# Patient Record
Sex: FEMALE | Race: BLACK OR AFRICAN AMERICAN | Employment: FULL TIME | URBAN - METROPOLITAN AREA
[De-identification: names, ages, dates, MRNs, and addresses within clinical notes are randomized per-mention and may not be internally consistent; named-entity substitution may affect disease eponyms.]

---

## 2018-01-30 ENCOUNTER — HOSPITAL ENCOUNTER (EMERGENCY)
Age: 28
Discharge: HOME | End: 2018-01-30
Attending: OBSTETRICS & GYNECOLOGY | Admitting: OBSTETRICS & GYNECOLOGY

## 2018-01-30 ENCOUNTER — APPOINTMENT (OUTPATIENT)
Dept: ULTRASOUND IMAGING | Age: 28
End: 2018-01-30
Attending: OBSTETRICS & GYNECOLOGY
Payer: MEDICAID

## 2018-01-30 ENCOUNTER — HOSPITAL ENCOUNTER (OUTPATIENT)
Age: 28
Setting detail: OBSERVATION
Discharge: HOME | End: 2018-01-30
Attending: OBSTETRICS & GYNECOLOGY | Admitting: OBSTETRICS & GYNECOLOGY
Payer: MEDICAID

## 2018-01-30 VITALS
TEMPERATURE: 98.2 F | DIASTOLIC BLOOD PRESSURE: 57 MMHG | RESPIRATION RATE: 17 BRPM | HEART RATE: 68 BPM | OXYGEN SATURATION: 98 % | SYSTOLIC BLOOD PRESSURE: 116 MMHG

## 2018-01-30 PROBLEM — R10.9 ABDOMINAL PAIN IN PREGNANCY, THIRD TRIMESTER: Status: ACTIVE | Noted: 2018-01-30

## 2018-01-30 PROBLEM — O26.893 ABDOMINAL PAIN IN PREGNANCY, THIRD TRIMESTER: Status: ACTIVE | Noted: 2018-01-30

## 2018-01-30 LAB
ALBUMIN SERPL-MCNC: 2.6 G/DL (ref 3.5–5)
ALBUMIN/GLOB SERPL: 0.6 {RATIO} (ref 1.2–3.5)
ALP SERPL-CCNC: 102 U/L (ref 50–136)
ALT SERPL-CCNC: 24 U/L (ref 12–65)
AMPHET UR QL SCN: NEGATIVE
AMYLASE SERPL-CCNC: 33 U/L (ref 15–115)
ANION GAP SERPL CALC-SCNC: 14 MMOL/L (ref 7–16)
AST SERPL-CCNC: 43 U/L (ref 15–37)
BARBITURATES UR QL SCN: NEGATIVE
BASOPHILS # BLD: 0 K/UL (ref 0–0.2)
BASOPHILS NFR BLD: 0 % (ref 0–2)
BENZODIAZ UR QL: NEGATIVE
BILIRUB SERPL-MCNC: 0.6 MG/DL (ref 0.2–1.1)
BUN SERPL-MCNC: 5 MG/DL (ref 6–23)
CALCIUM SERPL-MCNC: 8.7 MG/DL (ref 8.3–10.4)
CANNABINOIDS UR QL SCN: NEGATIVE
CHLORIDE SERPL-SCNC: 104 MMOL/L (ref 98–107)
CO2 SERPL-SCNC: 22 MMOL/L (ref 21–32)
COCAINE UR QL SCN: NEGATIVE
CREAT SERPL-MCNC: 0.69 MG/DL (ref 0.6–1)
DIFFERENTIAL METHOD BLD: ABNORMAL
EOSINOPHIL # BLD: 0.1 K/UL (ref 0–0.8)
EOSINOPHIL NFR BLD: 1 % (ref 0.5–7.8)
ERYTHROCYTE [DISTWIDTH] IN BLOOD BY AUTOMATED COUNT: 14.2 % (ref 11.9–14.6)
GLOBULIN SER CALC-MCNC: 4.2 G/DL (ref 2.3–3.5)
GLUCOSE SERPL-MCNC: 102 MG/DL (ref 65–100)
GLUCOSE, GLUUPC: NEGATIVE
HCT VFR BLD AUTO: 34.7 % (ref 35.8–46.3)
HGB BLD-MCNC: 11.6 G/DL (ref 11.7–15.4)
IMM GRANULOCYTES # BLD: 0 K/UL (ref 0–0.5)
IMM GRANULOCYTES NFR BLD AUTO: 0 % (ref 0–5)
KETONES UR-MCNC: NORMAL MG/DL
LIPASE SERPL-CCNC: 122 U/L (ref 73–393)
LYMPHOCYTES # BLD: 1.9 K/UL (ref 0.5–4.6)
LYMPHOCYTES NFR BLD: 19 % (ref 13–44)
MCH RBC QN AUTO: 28 PG (ref 26.1–32.9)
MCHC RBC AUTO-ENTMCNC: 33.4 G/DL (ref 31.4–35)
MCV RBC AUTO: 83.6 FL (ref 79.6–97.8)
METHADONE UR QL: NEGATIVE
MONOCYTES # BLD: 0.8 K/UL (ref 0.1–1.3)
MONOCYTES NFR BLD: 8 % (ref 4–12)
NEUTS SEG # BLD: 7.3 K/UL (ref 1.7–8.2)
NEUTS SEG NFR BLD: 72 % (ref 43–78)
OPIATES UR QL: NEGATIVE
PCP UR QL: NEGATIVE
PLATELET # BLD AUTO: 299 K/UL (ref 150–450)
PMV BLD AUTO: 10 FL (ref 10.8–14.1)
POTASSIUM SERPL-SCNC: 4.2 MMOL/L (ref 3.5–5.1)
PROT SERPL-MCNC: 6.8 G/DL (ref 6.3–8.2)
PROT UR QL: NORMAL
RBC # BLD AUTO: 4.15 M/UL (ref 4.05–5.25)
SODIUM SERPL-SCNC: 140 MMOL/L (ref 136–145)
WBC # BLD AUTO: 10 K/UL (ref 4.3–11.1)

## 2018-01-30 PROCEDURE — 82150 ASSAY OF AMYLASE: CPT | Performed by: OBSTETRICS & GYNECOLOGY

## 2018-01-30 PROCEDURE — 80307 DRUG TEST PRSMV CHEM ANLYZR: CPT | Performed by: OBSTETRICS & GYNECOLOGY

## 2018-01-30 PROCEDURE — 74011250636 HC RX REV CODE- 250/636: Performed by: OBSTETRICS & GYNECOLOGY

## 2018-01-30 PROCEDURE — 99282 EMERGENCY DEPT VISIT SF MDM: CPT

## 2018-01-30 PROCEDURE — 59025 FETAL NON-STRESS TEST: CPT

## 2018-01-30 PROCEDURE — 96375 TX/PRO/DX INJ NEW DRUG ADDON: CPT

## 2018-01-30 PROCEDURE — 74011258636 HC RX REV CODE- 258/636: Performed by: OBSTETRICS & GYNECOLOGY

## 2018-01-30 PROCEDURE — 83690 ASSAY OF LIPASE: CPT | Performed by: OBSTETRICS & GYNECOLOGY

## 2018-01-30 PROCEDURE — 96374 THER/PROPH/DIAG INJ IV PUSH: CPT

## 2018-01-30 PROCEDURE — 80053 COMPREHEN METABOLIC PANEL: CPT | Performed by: OBSTETRICS & GYNECOLOGY

## 2018-01-30 PROCEDURE — 85025 COMPLETE CBC W/AUTO DIFF WBC: CPT | Performed by: OBSTETRICS & GYNECOLOGY

## 2018-01-30 PROCEDURE — 76705 ECHO EXAM OF ABDOMEN: CPT

## 2018-01-30 PROCEDURE — 99218 HC RM OBSERVATION: CPT

## 2018-01-30 PROCEDURE — 96361 HYDRATE IV INFUSION ADD-ON: CPT

## 2018-01-30 PROCEDURE — 81002 URINALYSIS NONAUTO W/O SCOPE: CPT | Performed by: OBSTETRICS & GYNECOLOGY

## 2018-01-30 PROCEDURE — 96365 THER/PROPH/DIAG IV INF INIT: CPT

## 2018-01-30 PROCEDURE — 74011000250 HC RX REV CODE- 250: Performed by: OBSTETRICS & GYNECOLOGY

## 2018-01-30 RX ORDER — HYDROMORPHONE HYDROCHLORIDE 2 MG/ML
1 INJECTION, SOLUTION INTRAMUSCULAR; INTRAVENOUS; SUBCUTANEOUS ONCE
Status: COMPLETED | OUTPATIENT
Start: 2018-01-30 | End: 2018-01-30

## 2018-01-30 RX ORDER — SODIUM CHLORIDE 9 MG/ML
150 INJECTION, SOLUTION INTRAVENOUS CONTINUOUS
Status: DISCONTINUED | OUTPATIENT
Start: 2018-01-30 | End: 2018-01-30 | Stop reason: HOSPADM

## 2018-01-30 RX ORDER — SODIUM CHLORIDE 0.9 % (FLUSH) 0.9 %
5-10 SYRINGE (ML) INJECTION AS NEEDED
Status: DISCONTINUED | OUTPATIENT
Start: 2018-01-30 | End: 2018-01-30 | Stop reason: HOSPADM

## 2018-01-30 RX ORDER — SODIUM CHLORIDE 0.9 % (FLUSH) 0.9 %
5-10 SYRINGE (ML) INJECTION EVERY 8 HOURS
Status: DISCONTINUED | OUTPATIENT
Start: 2018-01-30 | End: 2018-01-30 | Stop reason: HOSPADM

## 2018-01-30 RX ORDER — DEXTROSE, SODIUM CHLORIDE, SODIUM LACTATE, POTASSIUM CHLORIDE, AND CALCIUM CHLORIDE 5; .6; .31; .03; .02 G/100ML; G/100ML; G/100ML; G/100ML; G/100ML
125 INJECTION, SOLUTION INTRAVENOUS CONTINUOUS
Status: DISCONTINUED | OUTPATIENT
Start: 2018-01-30 | End: 2018-01-30 | Stop reason: HOSPADM

## 2018-01-30 RX ADMIN — SODIUM CHLORIDE 150 ML/HR: 900 INJECTION, SOLUTION INTRAVENOUS at 02:24

## 2018-01-30 RX ADMIN — PROMETHAZINE HYDROCHLORIDE 12.5 MG: 25 INJECTION INTRAMUSCULAR; INTRAVENOUS at 02:27

## 2018-01-30 RX ADMIN — SODIUM CHLORIDE, SODIUM LACTATE, POTASSIUM CHLORIDE, CALCIUM CHLORIDE, AND DEXTROSE MONOHYDRATE 125 ML/HR: 600; 310; 30; 20; 5 INJECTION, SOLUTION INTRAVENOUS at 03:05

## 2018-01-30 RX ADMIN — HYDROMORPHONE HYDROCHLORIDE 1 MG: 2 INJECTION, SOLUTION INTRAMUSCULAR; INTRAVENOUS; SUBCUTANEOUS at 03:00

## 2018-01-30 NOTE — PROGRESS NOTES
SBAR report from Nellie Sullivan RN, care assumed. Patient being admitted to room 439 for 23 hour observations per Dr. Bonnie Rehman r/t abdominal pain.

## 2018-01-30 NOTE — PROGRESS NOTES
Patient taken off monitor taken to room 439 via stretcher spouse remains at bedside.   SBAR report to Lilli Franco RN

## 2018-01-30 NOTE — PROGRESS NOTES
01/30/18 0255   Maternal Vital Signs   Temp 98.2 °F (36.8 °C)   Temp Source Oral   Pulse (Heart Rate) 69   Resp Rate 16   O2 Sat (%) 97 %   Level of Consciousness Alert   BP 95/46   MAP (Calculated) (!) 62   BP 1 Method Automatic   BP 1 Location Right arm   BP Patient Position Hip tilt, left     Dr. Olinda Davison at bedside. Patient very drowsy but arousable with verbal commands. Will continue EFM monitoring and Oxygen Saturation. Labs received and reported to Dr. Olinda Davison, no new orders. SCDs applied, IV infusing (see MAR), Assessment completed (fall and mo scale assessed)- see flow sheet. Family remains at bedside, will continue to monitor. Instructed to call as needed.

## 2018-01-30 NOTE — PROGRESS NOTES
Prenatal records received from [address] [place] [address] (with Authorization for Release of Protected Health information documentation signed by patient). Reviewed at this time and placed in chart accordingly.

## 2018-01-30 NOTE — PROGRESS NOTES
Patient presents to triage in wheelchair accompained by spouse, crying and moaning loudly in visible pain. Taking to Triage room 1 toco and cardio applied, patient continues to cry out on pain. Spouse states that they were seen at [facility] earlier around 1900 that she was there for about an hour they gave her something for indigestion. She states that this seemed to help they went and at around 2330 she woke up with extreme upper quadrant pain that was constant. Patient states that she has thrown up earlier today at work that she works at a  and that they sent 2 children home today sick.

## 2018-01-30 NOTE — H&P
History & Physical    Name: Jeronimo Hua MRN: [id number]  SSN: [id number]    YOB: 1990  Age: 34 y.o. Sex: female      Subjective:     Reason for Admission:  Pregnancy and right upper quadrant abdominal pain    History of Present Illness: Ms. Beverli Runner is a 34 y.o. G1  female with an estimated gestational age of 29 weeks with EDC of 3/19/2018. Patient complains of severe abdominal pain for 1 days. Pregnancy has been complicated by no complications. Pt was well until around noon today when she began having ruq abd pain. She was seen at [facility] and while there pain improved and she was given po pepcid . She then ate chick filet and pain returned- more severe with associated vomiting. No diarrhea. No fever. 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 OB History   No data available     PMHx: none  PSHX: none  Family HX: grandmother with breast cancer and diabetes  Soc Hx: former smoker  Works in day care  Fob involved     Review of Systems:  A comprehensive review of systems was negative except for that written in the History of Present Illness. Objective:     Vitals: There were no vitals filed for this visit. Temp (24hrs), Av °F (-17.8 °C), Min:, Max:    No Data Recorded     Physical Exam:  Patient with distress- appears in significant pain on arrival. Pain improving while in triage.   Heart: Regular rate and rhythm  Lung: clear to auscultation throughout lung fields, no wheezes, no rales, no rhonchi and normal respiratory effort  Back: costovertebral angle tenderness absent  Abdomen: soft, nontender  Fundus: soft and non tender  Perineum: blood absent, amniotic fluid absent  Cervical Exam: Closed/Thick/High  Lower Extremities:  - Edema No   - Patellar Reflexes: 2+ bilaterally     Membranes:  Intact  Uterine Activity:  None  Fetal Heart Rate:  Reactive       Lab/Data Review:  Recent Results (from the past 12 hour(s))   CBC WITH AUTOMATED DIFF    Collection Time: 01/30/18  2:11 AM   Result Value Ref Range    WBC 10.0 4.3 - 11.1 K/uL    RBC 4.15 4.05 - 5.25 M/uL    HGB 11.6 (L) 11.7 - 15.4 g/dL    HCT 34.7 (L) 35.8 - 46.3 %    MCV 83.6 79.6 - 97.8 FL    MCH 28.0 26.1 - 32.9 PG    MCHC 33.4 31.4 - 35.0 g/dL    RDW 14.2 11.9 - 14.6 %    PLATELET 662 949 - 109 K/uL    MPV 10.0 (L) 10.8 - 14.1 FL    DF AUTOMATED      NEUTROPHILS 72 43 - 78 %    LYMPHOCYTES 19 13 - 44 %    MONOCYTES 8 4.0 - 12.0 %    EOSINOPHILS 1 0.5 - 7.8 %    BASOPHILS 0 0.0 - 2.0 %    IMMATURE GRANULOCYTES 0 0.0 - 5.0 %    ABS. NEUTROPHILS 7.3 1.7 - 8.2 K/UL    ABS. LYMPHOCYTES 1.9 0.5 - 4.6 K/UL    ABS. MONOCYTES 0.8 0.1 - 1.3 K/UL    ABS. EOSINOPHILS 0.1 0.0 - 0.8 K/UL    ABS. BASOPHILS 0.0 0.0 - 0.2 K/UL    ABS. IMM. GRANS. 0.0 0.0 - 0.5 K/UL   METABOLIC PANEL, COMPREHENSIVE    Collection Time: 01/30/18  2:11 AM   Result Value Ref Range    Sodium 140 136 - 145 mmol/L    Potassium 4.2 3.5 - 5.1 mmol/L    Chloride 104 98 - 107 mmol/L    CO2 22 21 - 32 mmol/L    Anion gap 14 7 - 16 mmol/L    Glucose 102 (H) 65 - 100 mg/dL    BUN 5 (L) 6 - 23 MG/DL    Creatinine 0.69 0.6 - 1.0 MG/DL    GFR est AA >60 >60 ml/min/1.73m2    GFR est non-AA >60 >60 ml/min/1.73m2    Calcium 8.7 8.3 - 10.4 MG/DL    Bilirubin, total 0.6 0.2 - 1.1 MG/DL    ALT (SGPT) 24 12 - 65 U/L    AST (SGOT) 43 (H) 15 - 37 U/L    Alk.  phosphatase 102 50 - 136 U/L    Protein, total 6.8 6.3 - 8.2 g/dL    Albumin 2.6 (L) 3.5 - 5.0 g/dL    Globulin 4.2 (H) 2.3 - 3.5 g/dL    A-G Ratio 0.6 (L) 1.2 - 3.5     LIPASE    Collection Time: 01/30/18  2:11 AM   Result Value Ref Range    Lipase 122 73 - 393 U/L   AMYLASE    Collection Time: 01/30/18  2:11 AM   Result Value Ref Range    Amylase 33 15 - 115 U/L   DRUG SCREEN, URINE    Collection Time: 01/30/18  2:20 AM   Result Value Ref Range    PCP(PHENCYCLIDINE) NEGATIVE       BENZODIAZEPINES NEGATIVE       COCAINE NEGATIVE       AMPHETAMINES NEGATIVE       METHADONE NEGATIVE       THC (TH-CANNABINOL) NEGATIVE       OPIATES NEGATIVE       BARBITURATES NEGATIVE          Assessment and Plan: Active Problems:    Abdominal pain in pregnancy, third trimester (1/30/2018)       Acute onset severe abdominal pain- already improved. Labs wnl.   Suspect cholelithiasis vs acute gastroenteritis with vomiting  Will monitor overnight with ultrasound of gallbladder in am  Reassuring fetal heart rate tracing with no evidence of labor    Signed By:  Alaina Davidson MD     January 30, 2018

## 2018-01-30 NOTE — PROGRESS NOTES
Slept well with no further pain or vomiting throughout night.   Ultrasound of gall bladder this am  Anticipate d/c home after ultrasound

## 2018-01-30 NOTE — PROGRESS NOTES
Given discharge instructions with verbalized understanding. Discharged home in good condition ambulatory.

## 2018-01-30 NOTE — IP AVS SNAPSHOT
[address] [place]  
[phone] Patient: Thirza Sicard MRN: [id number] UGI:[id number] About your hospitalization You were admitted on:  January 30, 2018 You last received care in the:  [facility] 4 ANTEPARTUM You were discharged on:  January 30, 2018 Why you were hospitalized Your primary diagnosis was:  Not on File Your diagnoses also included:  Abdominal Pain In Pregnancy, Third Trimester Follow-up Information None Discharge Orders None A check dilcia indicates which time of day the medication should be taken. My Medications ASK your doctor about these medications Instructions Each Dose to Equal  
 Morning Noon Evening Bedtime PRENATAL DHA+COMPLETE PRENATAL -300 mg-mcg-mg Cmpk Generic drug:  [id number] candice-folic-dha Your last dose was: Your next dose is: Take  by mouth. Discharge Instructions Learning About Acute Cholecystitis What is cholecystitis? Cholecystitis (say \"koh-lih-sis-TY-tus\") is inflammation of the gallbladder. The gallbladder stores bile. Bile helps the body digest food. Normally, the bile flows from the gallbladder to the small intestine. A gallstone stuck in the cystic duct is most often the cause of sudden (acute) cholecystitis. The cystic duct is the tube that carries the bile out of the gallbladder. The gallstone blocks the bile from leaving the gallbladder. This results in an irritated and swollen gallbladder. The disease can also be caused by infection or trauma, such as an injury from a car accident. Cholecystitis has to be treated right away. You will probably have to go to the hospital. Surgery is the usual treatment. What are the symptoms? Symptoms include: · Steady and severe pain in the upper right part of belly.  This is the most common symptom. The pain can sometimes move to your back or right shoulder blade. It may last for more than 6 hours. · Nausea or vomiting. · A fever. How is it treated? The main way to treat this disease is surgery to remove the gallbladder. This surgery can often be done through small cuts (incisions) in the belly. This is called a laparoscopic cholecystectomy. In some cases, you may need a more extensive surgery. You may need surgery as soon as possible. The doctor may try to reduce swelling and irritation in the gallbladder before removing it. You may be given fluids and antibiotics through an IV. You may also be given pain medicine. Follow-up care is a key part of your treatment and safety. Be sure to make and go to all appointments, and call your doctor if you are having problems. It's also a good idea to know your test results and keep a list of the medicines you take. Where can you learn more? Go to http://cate-asa.info/. Enter T940 in the search box to learn more about \"Learning About Acute Cholecystitis. \" Current as of: May 12, 2017 Content Version: 11.4 © 5371-2539 [employer]. Care instructions adapted under license by [employer] (which disclaims liability or warranty for this information). If you have questions about a medical condition or this instruction, always ask your healthcare professional. [place] [address] any warranty or liability for your use of this information. Pregnancy Precautions: Care Instructions Your Care Instructions There is no sure way to prevent labor before your due date ( labor) or to prevent most other pregnancy problems. But there are things you can do to increase your chances of a healthy pregnancy. Go to your appointments, follow your doctor's advice, and take good care of yourself. Eat well, and exercise (if your doctor agrees). And make sure to drink plenty of water. Follow-up care is a key part of your treatment and safety. Be sure to make and go to all appointments, and call your doctor if you are having problems. It's also a good idea to know your test results and keep a list of the medicines you take. How can you care for yourself at home? · Make sure you go to your prenatal appointments. At each visit, your doctor will check your blood pressure. Your doctor will also check to see if you have protein in your urine. High blood pressure and protein in urine are signs of preeclampsia. This condition can be dangerous for you and your baby. · Drink plenty of fluids, enough so that your urine is light yellow or clear like water. Dehydration can cause contractions. If you have kidney, heart, or liver disease and have to limit fluids, talk with your doctor before you increase the amount of fluids you drink. · Tell your doctor right away if you notice any symptoms of an infection, such as: ¨ Burning when you urinate. ¨ A foul-smelling discharge from your vagina. ¨ Vaginal itching. ¨ Unexplained fever. ¨ Unusual pain or soreness in your uterus or lower belly. · Eat a balanced diet. Include plenty of foods that are high in calcium and iron. ¨ Foods high in calcium include milk, cheese, yogurt, almonds, and broccoli. ¨ Foods high in iron include red meat, shellfish, poultry, eggs, beans, raisins, whole-grain bread, and leafy green vegetables. · Do not smoke. If you need help quitting, talk to your doctor about stop-smoking programs and medicines. These can increase your chances of quitting for good. · Do not drink alcohol or use illegal drugs. · Follow your doctor's directions about activity. Your doctor will let you know how much, if any, exercise you can do. · Ask your doctor if you can have sex. If you are at risk for early labor, your doctor may ask you to not have sex. · Take care to prevent falls.  During pregnancy, your joints are loose, and your balance is off. Sports such as bicycling, skiing, or in-line skating can increase your risk of falling. And don't ride horses or motorcycles, dive, water ski, scuba dive, or parachute jump while you are pregnant. · Avoid getting very hot. Do not use saunas or hot tubs. Avoid staying out in the sun in hot weather for long periods. Take acetaminophen (Tylenol) to lower a high fever. · Do not take any over-the-counter or herbal medicines or supplements without talking to your doctor or pharmacist first. 
When should you call for help? Call 911 anytime you think you may need emergency care. For example, call if: 
? · You passed out (lost consciousness). ? · You have severe vaginal bleeding. ? · You have severe pain in your belly or pelvis. ? · You have had fluid gushing or leaking from your vagina and you know or think the umbilical cord is bulging into your vagina. If this happens, immediately get down on your knees so your rear end (buttocks) is higher than your head. This will decrease the pressure on the cord until help arrives. ?Call your doctor now or seek immediate medical care if: 
? · You have signs of preeclampsia, such as: 
¨ Sudden swelling of your face, hands, or feet. ¨ New vision problems (such as dimness or blurring). ¨ A severe headache. ? · You have any vaginal bleeding. ? · You have belly pain or cramping. ? · You have a fever. ? · You have had regular contractions (with or without pain) for an hour. This means that you have 8 or more within 1 hour or 4 or more in 20 minutes after you change your position and drink fluids. ? · You have a sudden release of fluid from your vagina. ? · You have low back pain or pelvic pressure that does not go away. ? · You notice that your baby has stopped moving or is moving much less than normal. ? Watch closely for changes in your health, and be sure to contact your doctor if you have any problems. Where can you learn more? Go to http://cate-asa.info/. Enter [id number] in the search box to learn more about \"Pregnancy Precautions: Care Instructions. \" Current as of: March 16, 2017 Content Version: 11.4 © 7084-9662 [employer]. Care instructions adapted under license by [employer] (which disclaims liability or warranty for this information). If you have questions about a medical condition or this instruction, always ask your healthcare professional. [address] any warranty or liability for your use of this information. Introducing \[facility]\"" & HEALTH SERVICES! [employer] introduces Zhihu patient portal. Now you can access parts of your medical record, email your doctor's office, and request medication refills online. 1. In your internet browser, go to [email or web address] 2. Click on the First Time User? Click Here link in the Sign In box. You will see the New Member Sign Up page. 3. Enter your Zhihu Access Code exactly as it appears below. You will not need to use this code after youve completed the sign-up process. If you do not sign up before the expiration date, you must request a new code. · Zhihu Access Code: [email or web address] Expires: 4/30/2018 11:14 AM 
 
4. Enter the last four digits of your Social Security Number (xxxx) and Date of Birth (mm/dd/yyyy) as indicated and click Submit. You will be taken to the next sign-up page. 5. Create a Zhihu ID. This will be your Zhihu login ID and cannot be changed, so think of one that is secure and easy to remember. 6. Create a Zhihu password. You can change your password at any time. 7. Enter your Password Reset Question and Answer. This can be used at a later time if you forget your password. 8. Enter your e-mail address. You will receive e-mail notification when new information is available in [address] 9. Click Sign Up. You can now view and download portions of your medical record. 10. Click the Download Summary menu link to download a portable copy of your medical information. If you have questions, please visit the Frequently Asked Questions section of the INFOGRAPHIQS website. Remember, INFOGRAPHIQS is NOT to be used for urgent needs. For medical emergencies, dial 911. Now available from your iPhone and Android! Providers Seen During Your Hospitalization Provider Specialty Primary office phone Gloria Navarro MD Obstetrics & Gynecology [phone] Your Primary Care Physician (PCP) Primary Care Physician Office Phone Office Fax UNKNOWN, PROVIDER ** None ** ** None ** You are allergic to the following No active allergies Recent Documentation Breastfeeding? OB Status Smoking Status No Pregnant Former Smoker Patient Belongings The following personal items are in your possession at time of discharge: 
  Dental Appliances: None         Home Medications: None   Jewelry: None  Clothing: Footwear, Shirt, Undergarments, With patient, Pants       Personal Items Sent to Safe: NONE Please provide this summary of care documentation to your next provider. Signatures-by signing, you are acknowledging that this After Visit Summary has been reviewed with you and you have received a copy. Patient Signature:  ____________________________________________________________ Date:  ____________________________________________________________  
  
Casey Christina Provider Signature:  ____________________________________________________________ Date:  ____________________________________________________________

## 2018-01-30 NOTE — IP AVS SNAPSHOT
Summary of Care Report The Summary of Care report has been created to help improve care coordination. Users with access to [employer] or [address] (Web-based application) may access additional patient information including the Discharge Summary. If you are not currently a [address] user and need more information, please call the number listed below in the [place] [address] section and ask to be connected with Medical Records. Facility Information Name Address Phone [address] [place] [address] [address] [place] [phone] Patient Information Patient Name Sex MELISSA Fuentes ([id number]) Female 1990 Discharge Information Admitting Provider Service Area Unit Rere Oliver MD / [id number] [facility] 4 Antepartum / [phone] Discharge Provider Discharge Date/Time Discharge Disposition Destination (none) (none) (none) (none) Hospital Problems as of 2018  Never Reviewed Class Noted - Resolved Last Modified POA Active Problems Abdominal pain in pregnancy, third trimester  2018 - Present 2018 by Rere Oliver MD Unknown Entered by Rere Oliver MD  
  
You are allergic to the following No active allergies Current Discharge Medication List  
  
ASK your doctor about these medications Dose & Instructions Dispensing Information Comments PRENATAL DHA+COMPLETE PRENATAL 300 mg-mcg-mg Cmpk Generic drug:  [id number] candice-folic-dha Take  by mouth. Refills:  0 Follow-up Information None Discharge Instructions Learning About Acute Cholecystitis What is cholecystitis?  
 
Cholecystitis (say \"koh-lih-sis-TY-tus\") is inflammation of the gallbladder. The gallbladder stores bile. Bile helps the body digest food. Normally, the bile flows from the gallbladder to the small intestine. A gallstone stuck in the cystic duct is most often the cause of sudden (acute) cholecystitis. The cystic duct is the tube that carries the bile out of the gallbladder. The gallstone blocks the bile from leaving the gallbladder. This results in an irritated and swollen gallbladder. The disease can also be caused by infection or trauma, such as an injury from a car accident. Cholecystitis has to be treated right away. You will probably have to go to the hospital. Surgery is the usual treatment. What are the symptoms? Symptoms include: · Steady and severe pain in the upper right part of belly. This is the most common symptom. The pain can sometimes move to your back or right shoulder blade. It may last for more than 6 hours. · Nausea or vomiting. · A fever. How is it treated? The main way to treat this disease is surgery to remove the gallbladder. This surgery can often be done through small cuts (incisions) in the belly. This is called a laparoscopic cholecystectomy. In some cases, you may need a more extensive surgery. You may need surgery as soon as possible. The doctor may try to reduce swelling and irritation in the gallbladder before removing it. You may be given fluids and antibiotics through an IV. You may also be given pain medicine. Follow-up care is a key part of your treatment and safety. Be sure to make and go to all appointments, and call your doctor if you are having problems. It's also a good idea to know your test results and keep a list of the medicines you take. Where can you learn more? Go to http://cate-asa.info/. Enter T940 in the search box to learn more about \"Learning About Acute Cholecystitis. \" Current as of: May 12, 2017 Content Version: 11.4 © 2938-0173 [employer]. Care instructions adapted under license by [employer] (which disclaims liability or warranty for this information). If you have questions about a medical condition or this instruction, always ask your healthcare professional. [address] any warranty or liability for your use of this information. Pregnancy Precautions: Care Instructions Your Care Instructions There is no sure way to prevent labor before your due date ( labor) or to prevent most other pregnancy problems. But there are things you can do to increase your chances of a healthy pregnancy. Go to your appointments, follow your doctor's advice, and take good care of yourself. Eat well, and exercise (if your doctor agrees). And make sure to drink plenty of water. Follow-up care is a key part of your treatment and safety. Be sure to make and go to all appointments, and call your doctor if you are having problems. It's also a good idea to know your test results and keep a list of the medicines you take. How can you care for yourself at home? · Make sure you go to your prenatal appointments. At each visit, your doctor will check your blood pressure. Your doctor will also check to see if you have protein in your urine. High blood pressure and protein in urine are signs of preeclampsia. This condition can be dangerous for you and your baby. · Drink plenty of fluids, enough so that your urine is light yellow or clear like water. Dehydration can cause contractions. If you have kidney, heart, or liver disease and have to limit fluids, talk with your doctor before you increase the amount of fluids you drink. · Tell your doctor right away if you notice any symptoms of an infection, such as: ¨ Burning when you urinate. ¨ A foul-smelling discharge from your vagina. ¨ Vaginal itching. ¨ Unexplained fever. ¨ Unusual pain or soreness in your uterus or lower belly. · Eat a balanced diet. Include plenty of foods that are high in calcium and iron. ¨ Foods high in calcium include milk, cheese, yogurt, almonds, and broccoli. ¨ Foods high in iron include red meat, shellfish, poultry, eggs, beans, raisins, whole-grain bread, and leafy green vegetables. · Do not smoke. If you need help quitting, talk to your doctor about stop-smoking programs and medicines. These can increase your chances of quitting for good. · Do not drink alcohol or use illegal drugs. · Follow your doctor's directions about activity. Your doctor will let you know how much, if any, exercise you can do. · Ask your doctor if you can have sex. If you are at risk for early labor, your doctor may ask you to not have sex. · Take care to prevent falls. During pregnancy, your joints are loose, and your balance is off. Sports such as bicycling, skiing, or in-line skating can increase your risk of falling. And don't ride horses or motorcycles, dive, water ski, scuba dive, or parachute jump while you are pregnant. · Avoid getting very hot. Do not use saunas or hot tubs. Avoid staying out in the sun in hot weather for long periods. Take acetaminophen (Tylenol) to lower a high fever. · Do not take any over-the-counter or herbal medicines or supplements without talking to your doctor or pharmacist first. 
When should you call for help? Call 911 anytime you think you may need emergency care. For example, call if: 
? · You passed out (lost consciousness). ? · You have severe vaginal bleeding. ? · You have severe pain in your belly or pelvis. ? · You have had fluid gushing or leaking from your vagina and you know or think the umbilical cord is bulging into your vagina. If this happens, immediately get down on your knees so your rear end (buttocks) is higher than your head. This will decrease the pressure on the cord until help arrives. ?Call your doctor now or seek immediate medical care if: ? · You have signs of preeclampsia, such as: 
¨ Sudden swelling of your face, hands, or feet. ¨ New vision problems (such as dimness or blurring). ¨ A severe headache. ? · You have any vaginal bleeding. ? · You have belly pain or cramping. ? · You have a fever. ? · You have had regular contractions (with or without pain) for an hour. This means that you have 8 or more within 1 hour or 4 or more in 20 minutes after you change your position and drink fluids. ? · You have a sudden release of fluid from your vagina. ? · You have low back pain or pelvic pressure that does not go away. ? · You notice that your baby has stopped moving or is moving much less than normal. ? Watch closely for changes in your health, and be sure to contact your doctor if you have any problems. Where can you learn more? Go to http://cate-asa.info/. Enter [id number] in the search box to learn more about \"Pregnancy Precautions: Care Instructions. \" Current as of: March 16, 2017 Content Version: 11.4 © 7377-9921 [employer]. Care instructions adapted under license by [employer] (which disclaims liability or warranty for this information). If you have questions about a medical condition or this instruction, always ask your healthcare professional. [place] [address] any warranty or liability for your use of this information. Chart Review Routing History No Routing History on File

## 2018-01-30 NOTE — DISCHARGE INSTRUCTIONS
Learning About Acute Cholecystitis  What is cholecystitis? Cholecystitis (say \"koh-lih-sis-TY-tus\") is inflammation of the gallbladder. The gallbladder stores bile. Bile helps the body digest food. Normally, the bile flows from the gallbladder to the small intestine. A gallstone stuck in the cystic duct is most often the cause of sudden (acute) cholecystitis. The cystic duct is the tube that carries the bile out of the gallbladder. The gallstone blocks the bile from leaving the gallbladder. This results in an irritated and swollen gallbladder. The disease can also be caused by infection or trauma, such as an injury from a car accident. Cholecystitis has to be treated right away. You will probably have to go to the hospital. Surgery is the usual treatment. What are the symptoms? Symptoms include:  · Steady and severe pain in the upper right part of belly. This is the most common symptom. The pain can sometimes move to your back or right shoulder blade. It may last for more than 6 hours. · Nausea or vomiting. · A fever. How is it treated? The main way to treat this disease is surgery to remove the gallbladder. This surgery can often be done through small cuts (incisions) in the belly. This is called a laparoscopic cholecystectomy. In some cases, you may need a more extensive surgery. You may need surgery as soon as possible. The doctor may try to reduce swelling and irritation in the gallbladder before removing it. You may be given fluids and antibiotics through an IV. You may also be given pain medicine. Follow-up care is a key part of your treatment and safety. Be sure to make and go to all appointments, and call your doctor if you are having problems. It's also a good idea to know your test results and keep a list of the medicines you take. Where can you learn more? Go to http://cate-asa.info/.   Enter T940 in the search box to learn more about \"Learning About Acute Cholecystitis. \"  Current as of: May 12, 2017  Content Version: 11.4  © 2471-7602 [employer]. Care instructions adapted under license by [employer] (which disclaims liability or warranty for this information). If you have questions about a medical condition or this instruction, always ask your healthcare professional. [address] any warranty or liability for your use of this information. Pregnancy Precautions: Care Instructions  Your Care Instructions    There is no sure way to prevent labor before your due date ( labor) or to prevent most other pregnancy problems. But there are things you can do to increase your chances of a healthy pregnancy. Go to your appointments, follow your doctor's advice, and take good care of yourself. Eat well, and exercise (if your doctor agrees). And make sure to drink plenty of water. Follow-up care is a key part of your treatment and safety. Be sure to make and go to all appointments, and call your doctor if you are having problems. It's also a good idea to know your test results and keep a list of the medicines you take. How can you care for yourself at home? · Make sure you go to your prenatal appointments. At each visit, your doctor will check your blood pressure. Your doctor will also check to see if you have protein in your urine. High blood pressure and protein in urine are signs of preeclampsia. This condition can be dangerous for you and your baby. · Drink plenty of fluids, enough so that your urine is light yellow or clear like water. Dehydration can cause contractions. If you have kidney, heart, or liver disease and have to limit fluids, talk with your doctor before you increase the amount of fluids you drink. · Tell your doctor right away if you notice any symptoms of an infection, such as:  ¨ Burning when you urinate. ¨ A foul-smelling discharge from your vagina. ¨ Vaginal itching.   ¨ Unexplained fever.  ¨ Unusual pain or soreness in your uterus or lower belly. · Eat a balanced diet. Include plenty of foods that are high in calcium and iron. ¨ Foods high in calcium include milk, cheese, yogurt, almonds, and broccoli. ¨ Foods high in iron include red meat, shellfish, poultry, eggs, beans, raisins, whole-grain bread, and leafy green vegetables. · Do not smoke. If you need help quitting, talk to your doctor about stop-smoking programs and medicines. These can increase your chances of quitting for good. · Do not drink alcohol or use illegal drugs. · Follow your doctor's directions about activity. Your doctor will let you know how much, if any, exercise you can do. · Ask your doctor if you can have sex. If you are at risk for early labor, your doctor may ask you to not have sex. · Take care to prevent falls. During pregnancy, your joints are loose, and your balance is off. Sports such as bicycling, skiing, or in-line skating can increase your risk of falling. And don't ride horses or motorcycles, dive, water ski, scuba dive, or parachute jump while you are pregnant. · Avoid getting very hot. Do not use saunas or hot tubs. Avoid staying out in the sun in hot weather for long periods. Take acetaminophen (Tylenol) to lower a high fever. · Do not take any over-the-counter or herbal medicines or supplements without talking to your doctor or pharmacist first.  When should you call for help? Call 911 anytime you think you may need emergency care. For example, call if:  ? · You passed out (lost consciousness). ? · You have severe vaginal bleeding. ? · You have severe pain in your belly or pelvis. ? · You have had fluid gushing or leaking from your vagina and you know or think the umbilical cord is bulging into your vagina. If this happens, immediately get down on your knees so your rear end (buttocks) is higher than your head. This will decrease the pressure on the cord until help arrives.    ?Call your doctor now or seek immediate medical care if:  ? · You have signs of preeclampsia, such as:  ¨ Sudden swelling of your face, hands, or feet. ¨ New vision problems (such as dimness or blurring). ¨ A severe headache. ? · You have any vaginal bleeding. ? · You have belly pain or cramping. ? · You have a fever. ? · You have had regular contractions (with or without pain) for an hour. This means that you have 8 or more within 1 hour or 4 or more in 20 minutes after you change your position and drink fluids. ? · You have a sudden release of fluid from your vagina. ? · You have low back pain or pelvic pressure that does not go away. ? · You notice that your baby has stopped moving or is moving much less than normal.   ? Watch closely for changes in your health, and be sure to contact your doctor if you have any problems. Where can you learn more? Go to http://cate-asa.info/. Enter [id number] in the search box to learn more about \"Pregnancy Precautions: Care Instructions. \"  Current as of: March 16, 2017  Content Version: 11.4  © 7205-7366 [employer]. Care instructions adapted under license by [employer] (which disclaims liability or warranty for this information). If you have questions about a medical condition or this instruction, always ask your healthcare professional. [address] any warranty or liability for your use of this information.

## 2018-01-30 NOTE — PROGRESS NOTES
01/30/18 0500   Maternal Vital Signs   Pulse (Heart Rate) 65   Resp Rate 17   O2 Sat (%) 98 %   Level of Consciousness Alert   /59   MAP (Calculated) 75   BP 1 Method Automatic   BP 1 Location Right arm   BP Patient Position At rest     Patient resting in bed with family at bedside. No c/o pain or discomfort at this time. Patient remains drowsy, but more alert. Will continue to monitor.

## 2018-03-07 PROBLEM — Z34.03 PRIMIGRAVIDA, THIRD TRIMESTER: Status: ACTIVE | Noted: 2018-03-07

## 2018-03-07 PROBLEM — Z22.330 GBS CARRIER: Status: ACTIVE | Noted: 2018-03-07

## 2018-03-09 PROBLEM — O99.019 ANEMIA AFFECTING THIRD PREGNANCY: Status: ACTIVE | Noted: 2018-03-09

## 2018-03-15 PROBLEM — O26.849 FETAL SIZE INCONSISTENT WITH DATES: Status: ACTIVE | Noted: 2018-03-15

## 2018-03-21 ENCOUNTER — ANESTHESIA EVENT (OUTPATIENT)
Dept: LABOR AND DELIVERY | Age: 28
DRG: 560 | End: 2018-03-21
Payer: MEDICAID

## 2018-03-21 ENCOUNTER — HOSPITAL ENCOUNTER (INPATIENT)
Age: 28
LOS: 2 days | Discharge: HOME | DRG: 560 | End: 2018-03-23
Attending: OBSTETRICS & GYNECOLOGY | Admitting: OBSTETRICS & GYNECOLOGY
Payer: MEDICAID

## 2018-03-21 ENCOUNTER — ANESTHESIA (OUTPATIENT)
Dept: LABOR AND DELIVERY | Age: 28
DRG: 560 | End: 2018-03-21
Payer: MEDICAID

## 2018-03-21 DIAGNOSIS — Z37.9 NORMAL LABOR: Primary | ICD-10-CM

## 2018-03-21 PROBLEM — Z3A.40 40 WEEKS GESTATION OF PREGNANCY: Status: ACTIVE | Noted: 2018-03-21

## 2018-03-21 LAB
ABO + RH BLD: NORMAL
BASE DEFICIT BLDCOA-SCNC: 5.3 MMOL/L (ref 0–2)
BASE DEFICIT BLDCOV-SCNC: 5.2 MMOL/L (ref 1.9–7.7)
BDY SITE: ABNORMAL
BDY SITE: ABNORMAL
BLOOD GROUP ANTIBODIES SERPL: NORMAL
ERYTHROCYTE [DISTWIDTH] IN BLOOD BY AUTOMATED COUNT: 14.5 % (ref 11.9–14.6)
HCO3 BLDCOA-SCNC: 24 MMOL/L (ref 22–26)
HCO3 BLDV-SCNC: 22 MMOL/L
HCT VFR BLD AUTO: 33.5 % (ref 35.8–46.3)
HGB BLD-MCNC: 11.2 G/DL (ref 11.7–15.4)
MCH RBC QN AUTO: 27.2 PG (ref 26.1–32.9)
MCHC RBC AUTO-ENTMCNC: 33.4 G/DL (ref 31.4–35)
MCV RBC AUTO: 81.3 FL (ref 79.6–97.8)
PCO2 BLDCOA: 62 MMHG (ref 33–49)
PCO2 BLDCOV: 46 MMHG (ref 14.1–43.3)
PH BLDCOA: 7.2 [PH] (ref 7.21–7.31)
PH BLDCOV: 7.29 [PH] (ref 7.2–7.44)
PLATELET # BLD AUTO: 331 K/UL (ref 150–450)
PMV BLD AUTO: 9.8 FL (ref 10.8–14.1)
PO2 BLDCOA: 22 MMHG (ref 9–19)
PO2 BLDV: 28 MMHG (ref 30.4–57.2)
RBC # BLD AUTO: 4.12 M/UL (ref 4.05–5.25)
SERVICE CMNT-IMP: ABNORMAL
SERVICE CMNT-IMP: ABNORMAL
SPECIMEN EXP DATE BLD: NORMAL
WBC # BLD AUTO: 8.2 K/UL (ref 4.3–11.1)

## 2018-03-21 PROCEDURE — 77030014125 HC TY EPDRL BBMI -B: Performed by: ANESTHESIOLOGY

## 2018-03-21 PROCEDURE — 75410000003 HC RECOV DEL/VAG/CSECN EA 0.5 HR

## 2018-03-21 PROCEDURE — 74011250637 HC RX REV CODE- 250/637: Performed by: OBSTETRICS & GYNECOLOGY

## 2018-03-21 PROCEDURE — 74011250636 HC RX REV CODE- 250/636

## 2018-03-21 PROCEDURE — 75410000000 HC DELIVERY VAGINAL/SINGLE

## 2018-03-21 PROCEDURE — 76060000078 HC EPIDURAL ANESTHESIA

## 2018-03-21 PROCEDURE — 82803 BLOOD GASES ANY COMBINATION: CPT

## 2018-03-21 PROCEDURE — 77030009413 HC ELECTRD SCALP COVD -A

## 2018-03-21 PROCEDURE — 59409 OBSTETRICAL CARE: CPT | Performed by: OBSTETRICS & GYNECOLOGY

## 2018-03-21 PROCEDURE — 77010026065 HC OXYGEN MINIMUM MEDICAL AIR

## 2018-03-21 PROCEDURE — 77030003028 HC SUT VCRL J&J -A

## 2018-03-21 PROCEDURE — 77030011945 HC CATH URIN INT ST MENT -A

## 2018-03-21 PROCEDURE — 77030005518 HC CATH URETH FOL 2W BARD -B

## 2018-03-21 PROCEDURE — 74011250636 HC RX REV CODE- 250/636: Performed by: OBSTETRICS & GYNECOLOGY

## 2018-03-21 PROCEDURE — A4300 CATH IMPL VASC ACCESS PORTAL: HCPCS | Performed by: ANESTHESIOLOGY

## 2018-03-21 PROCEDURE — 74011250637 HC RX REV CODE- 250/637: Performed by: ANESTHESIOLOGY

## 2018-03-21 PROCEDURE — 75410000002 HC LABOR FEE PER 1 HR

## 2018-03-21 PROCEDURE — 77030032490 HC SLV COMPR SCD KNE COVD -B

## 2018-03-21 PROCEDURE — 0HQ9XZZ REPAIR PERINEUM SKIN, EXTERNAL APPROACH: ICD-10-PCS | Performed by: OBSTETRICS & GYNECOLOGY

## 2018-03-21 PROCEDURE — 85027 COMPLETE CBC AUTOMATED: CPT | Performed by: OBSTETRICS & GYNECOLOGY

## 2018-03-21 PROCEDURE — 86900 BLOOD TYPING SEROLOGIC ABO: CPT | Performed by: OBSTETRICS & GYNECOLOGY

## 2018-03-21 PROCEDURE — 74011000258 HC RX REV CODE- 258: Performed by: OBSTETRICS & GYNECOLOGY

## 2018-03-21 PROCEDURE — 65270000029 HC RM PRIVATE

## 2018-03-21 PROCEDURE — 4A1HXCZ MONITORING OF PRODUCTS OF CONCEPTION, CARDIAC RATE, EXTERNAL APPROACH: ICD-10-PCS | Performed by: OBSTETRICS & GYNECOLOGY

## 2018-03-21 PROCEDURE — 77030018846 HC SOL IRR STRL H20 ICUM -A

## 2018-03-21 PROCEDURE — 36415 COLL VENOUS BLD VENIPUNCTURE: CPT | Performed by: OBSTETRICS & GYNECOLOGY

## 2018-03-21 PROCEDURE — 74011258636 HC RX REV CODE- 258/636: Performed by: OBSTETRICS & GYNECOLOGY

## 2018-03-21 RX ORDER — FAMOTIDINE 20 MG/1
20 TABLET, FILM COATED ORAL 2 TIMES DAILY
Status: DISCONTINUED | OUTPATIENT
Start: 2018-03-21 | End: 2018-03-23 | Stop reason: HOSPADM

## 2018-03-21 RX ORDER — MINERAL OIL
120 OIL (ML) ORAL
Status: COMPLETED | OUTPATIENT
Start: 2018-03-21 | End: 2018-03-21

## 2018-03-21 RX ORDER — FAMOTIDINE 20 MG/1
20 TABLET, FILM COATED ORAL ONCE
Status: DISCONTINUED | OUTPATIENT
Start: 2018-03-21 | End: 2018-03-21 | Stop reason: HOSPADM

## 2018-03-21 RX ORDER — OXYTOCIN/0.9 % SODIUM CHLORIDE 15/250 ML
250 PLASTIC BAG, INJECTION (ML) INTRAVENOUS ONCE
Status: ACTIVE | OUTPATIENT
Start: 2018-03-21 | End: 2018-03-22

## 2018-03-21 RX ORDER — HYDROCODONE BITARTRATE AND ACETAMINOPHEN 5; 325 MG/1; MG/1
1 TABLET ORAL
Status: DISCONTINUED | OUTPATIENT
Start: 2018-03-21 | End: 2018-03-23 | Stop reason: HOSPADM

## 2018-03-21 RX ORDER — OXYCODONE HYDROCHLORIDE 5 MG/1
10 TABLET ORAL
Status: DISCONTINUED | OUTPATIENT
Start: 2018-03-21 | End: 2018-03-23 | Stop reason: HOSPADM

## 2018-03-21 RX ORDER — IBUPROFEN 600 MG/1
600 TABLET ORAL
Status: DISCONTINUED | OUTPATIENT
Start: 2018-03-21 | End: 2018-03-23 | Stop reason: HOSPADM

## 2018-03-21 RX ORDER — DEXTROSE, SODIUM CHLORIDE, SODIUM LACTATE, POTASSIUM CHLORIDE, AND CALCIUM CHLORIDE 5; .6; .31; .03; .02 G/100ML; G/100ML; G/100ML; G/100ML; G/100ML
125 INJECTION, SOLUTION INTRAVENOUS CONTINUOUS
Status: DISCONTINUED | OUTPATIENT
Start: 2018-03-21 | End: 2018-03-22

## 2018-03-21 RX ORDER — SODIUM CHLORIDE 0.9 % (FLUSH) 0.9 %
5-10 SYRINGE (ML) INJECTION AS NEEDED
Status: DISCONTINUED | OUTPATIENT
Start: 2018-03-21 | End: 2018-03-22

## 2018-03-21 RX ORDER — SODIUM CHLORIDE 0.9 % (FLUSH) 0.9 %
5-10 SYRINGE (ML) INJECTION AS NEEDED
Status: DISCONTINUED | OUTPATIENT
Start: 2018-03-21 | End: 2018-03-21 | Stop reason: HOSPADM

## 2018-03-21 RX ORDER — OXYTOCIN/0.9 % SODIUM CHLORIDE 15/250 ML
250 PLASTIC BAG, INJECTION (ML) INTRAVENOUS ONCE
Status: ACTIVE | OUTPATIENT
Start: 2018-03-21 | End: 2018-03-21

## 2018-03-21 RX ORDER — SODIUM CHLORIDE 0.9 % (FLUSH) 0.9 %
5-10 SYRINGE (ML) INJECTION EVERY 8 HOURS
Status: DISCONTINUED | OUTPATIENT
Start: 2018-03-21 | End: 2018-03-21 | Stop reason: HOSPADM

## 2018-03-21 RX ORDER — OXYTOCIN/0.9 % SODIUM CHLORIDE 30/500 ML
.5-2 PLASTIC BAG, INJECTION (ML) INTRAVENOUS
Status: DISCONTINUED | OUTPATIENT
Start: 2018-03-21 | End: 2018-03-22

## 2018-03-21 RX ORDER — LIDOCAINE HYDROCHLORIDE 20 MG/ML
JELLY TOPICAL
Status: DISCONTINUED | OUTPATIENT
Start: 2018-03-21 | End: 2018-03-21 | Stop reason: HOSPADM

## 2018-03-21 RX ORDER — PROMETHAZINE HYDROCHLORIDE 25 MG/1
25 TABLET ORAL
Status: DISCONTINUED | OUTPATIENT
Start: 2018-03-21 | End: 2018-03-23 | Stop reason: HOSPADM

## 2018-03-21 RX ORDER — SIMETHICONE 80 MG
80 TABLET,CHEWABLE ORAL
Status: DISCONTINUED | OUTPATIENT
Start: 2018-03-21 | End: 2018-03-23 | Stop reason: HOSPADM

## 2018-03-21 RX ORDER — DIPHENHYDRAMINE HCL 25 MG
25 CAPSULE ORAL
Status: DISCONTINUED | OUTPATIENT
Start: 2018-03-21 | End: 2018-03-23 | Stop reason: HOSPADM

## 2018-03-21 RX ORDER — ROPIVACAINE HYDROCHLORIDE 2 MG/ML
INJECTION, SOLUTION EPIDURAL; INFILTRATION; PERINEURAL
Status: DISCONTINUED | OUTPATIENT
Start: 2018-03-21 | End: 2018-03-22 | Stop reason: HOSPADM

## 2018-03-21 RX ORDER — PRENATAL VIT 96/IRON FUM/FOLIC 27MG-0.8MG
1 TABLET ORAL DAILY
Status: DISCONTINUED | OUTPATIENT
Start: 2018-03-22 | End: 2018-03-23 | Stop reason: HOSPADM

## 2018-03-21 RX ORDER — SODIUM CHLORIDE 0.9 % (FLUSH) 0.9 %
5-10 SYRINGE (ML) INJECTION EVERY 8 HOURS
Status: DISCONTINUED | OUTPATIENT
Start: 2018-03-21 | End: 2018-03-22

## 2018-03-21 RX ORDER — OXYTOCIN/0.9 % SODIUM CHLORIDE 30/500 ML
PLASTIC BAG, INJECTION (ML) INTRAVENOUS
Status: COMPLETED
Start: 2018-03-21 | End: 2018-03-21

## 2018-03-21 RX ORDER — DOCUSATE SODIUM 100 MG/1
100 CAPSULE, LIQUID FILLED ORAL
Status: DISCONTINUED | OUTPATIENT
Start: 2018-03-21 | End: 2018-03-23 | Stop reason: HOSPADM

## 2018-03-21 RX ORDER — PENICILLIN G POTASSIUM 5000000 [IU]/1
INJECTION, POWDER, FOR SOLUTION INTRAMUSCULAR; INTRAVENOUS
Status: ACTIVE
Start: 2018-03-21 | End: 2018-03-21

## 2018-03-21 RX ORDER — SODIUM CHLORIDE 900 MG/100ML
INJECTION INTRAVENOUS
Status: ACTIVE
Start: 2018-03-21 | End: 2018-03-21

## 2018-03-21 RX ORDER — ZOLPIDEM TARTRATE 5 MG/1
5 TABLET ORAL
Status: DISCONTINUED | OUTPATIENT
Start: 2018-03-21 | End: 2018-03-23 | Stop reason: HOSPADM

## 2018-03-21 RX ORDER — LIDOCAINE HYDROCHLORIDE 10 MG/ML
1 INJECTION INFILTRATION; PERINEURAL
Status: DISCONTINUED | OUTPATIENT
Start: 2018-03-21 | End: 2018-03-21 | Stop reason: HOSPADM

## 2018-03-21 RX ORDER — OXYCODONE HYDROCHLORIDE 5 MG/1
5 TABLET ORAL
Status: DISCONTINUED | OUTPATIENT
Start: 2018-03-21 | End: 2018-03-23 | Stop reason: HOSPADM

## 2018-03-21 RX ADMIN — SODIUM CHLORIDE, SODIUM LACTATE, POTASSIUM CHLORIDE, CALCIUM CHLORIDE, AND DEXTROSE MONOHYDRATE 125 ML/HR: 600; 310; 30; 20; 5 INJECTION, SOLUTION INTRAVENOUS at 06:01

## 2018-03-21 RX ADMIN — OXYTOCIN 4 MILLI-UNITS/MIN: 10 INJECTION, SOLUTION INTRAMUSCULAR; INTRAVENOUS at 07:00

## 2018-03-21 RX ADMIN — MINERAL OIL 120 ML: 471.95 OIL ORAL at 17:00

## 2018-03-21 RX ADMIN — IBUPROFEN 600 MG: 600 TABLET, FILM COATED ORAL at 19:27

## 2018-03-21 RX ADMIN — PENICILLIN G POTASSIUM 2.5 MILLION UNITS: 20000000 POWDER, FOR SOLUTION INTRAVENOUS at 15:53

## 2018-03-21 RX ADMIN — PENICILLIN G POTASSIUM 2.5 MILLION UNITS: 20000000 POWDER, FOR SOLUTION INTRAVENOUS at 11:31

## 2018-03-21 RX ADMIN — Medication 4 MILLI-UNITS/MIN: at 07:00

## 2018-03-21 RX ADMIN — SODIUM CHLORIDE, SODIUM LACTATE, POTASSIUM CHLORIDE, CALCIUM CHLORIDE, AND DEXTROSE MONOHYDRATE 125 ML/HR: 600; 310; 30; 20; 5 INJECTION, SOLUTION INTRAVENOUS at 11:30

## 2018-03-21 RX ADMIN — SODIUM CHLORIDE 5 MILLION UNITS: 900 INJECTION, SOLUTION INTRAVENOUS at 03:00

## 2018-03-21 RX ADMIN — ROPIVACAINE HYDROCHLORIDE 8 ML/HR: 2 INJECTION, SOLUTION EPIDURAL; INFILTRATION; PERINEURAL at 08:30

## 2018-03-21 RX ADMIN — FAMOTIDINE 20 MG: 20 TABLET, FILM COATED ORAL at 08:08

## 2018-03-21 RX ADMIN — PENICILLIN G POTASSIUM 2.5 MILLION UNITS: 20000000 POWDER, FOR SOLUTION INTRAVENOUS at 07:22

## 2018-03-21 NOTE — PROGRESS NOTES
56- baby girl  46- placenta  3365- first degree midline lac repaired without difficulty  1750- pericare/pad change. Back to bed now.

## 2018-03-21 NOTE — L&D DELIVERY NOTE
Present for entire delivery. Details in delivery summary. . Viable Female Infant, APGARS 8,9 .   Weight 7 lbs. , 5 oz.  mL  Pain mgmt  epidural    Placenta spont, intact, 3VC. 1st degree midline vaginal/perineal laceration - repaired 3-0 wicryl rapide    NICU present, cord gases sent. Pt and infant stable.         Tolu Escudero MD     5:50 PM    18

## 2018-03-21 NOTE — PROGRESS NOTES
o2 in place. Pt pulled up in the bed. Gomez placed to gravity without difficulty. Concentrated urine returned. Pitocin remains on 2 mu/min. Pt comfortable.

## 2018-03-21 NOTE — PROGRESS NOTES
Subjective: Pt tolerating labor well. Objective:    Vitals:    18 0935 18 0949 18 1004 18 1018   BP: 122/58 129/69 123/68 121/67   Pulse: 63 70 88 75   Temp:           FHT's:  Baseline 's, occ variable and late decels, good RTB, reactive  [place]:  Ctx q 3-5 min    SVE:  /-1    Assessement and Plan: Val Yanez 34 y.o.  at 40w2d admitted for postdates IOL    Continue pitocin augmentation.   IUPC and FSE placed without difficulty    Pain control: epidural    GBS: positive, being treated      Jose Mccall MD    11:33 AM    18

## 2018-03-21 NOTE — PROGRESS NOTES
Dr Milena Mensah updated on pt status. May have an epidural. MD rodriguez to [facility] for arom.  LR bolus started

## 2018-03-21 NOTE — PROGRESS NOTES
Dr Kenna Naik at bs. Arom. 4-5/c/0. Light meconium. lr bolusing.  Per dr Tata Da Silva - give a pepcid po

## 2018-03-21 NOTE — PROGRESS NOTES
Baby girl at 58 with neonatology and RT present for delivery for meconium. apgars 8/9. edc of 40.2 weeks  Weight of 3330g (7klbs 5oz) and length of 20.5in (52cm). Small midline 1st degree laceration repaired without difficulty. gbs positive and pt received 4 doses of abx. AGA. Assessment completed and wnl.

## 2018-03-21 NOTE — IP AVS SNAPSHOT
[id number] 
[phone] Patient: Val Yanez MRN: [id number] CVW:8/21/[birth year]441 About your hospitalization You were admitted on:  March 21, 2018 You last received care in the:  [address] [facility] You were discharged on:  March 23, 2018 Why you were hospitalized Your primary diagnosis was:  Not on File Your diagnoses also included:  Normal Labor, 40 Weeks Gestation Of Pregnancy Follow-up Information Follow up With Details Comments Contact Info Jose Mccall MD In 6 weeks Patient to call and make follow up appointment for a 6 week check up  [address] [place] [address] [facility] [address] 
[phone] Your Scheduled Appointments Wednesday May 02, 2018  8:45 AM EDT  
EST GYN with MD NATHALY Eid [facility] (Formerly [facility]) [address] [place] [address] [place] [id number] [phone] Discharge Orders None A check dilcia indicates which time of day the medication should be taken. My Medications START taking these medications Instructions Each Dose to Equal  
 Morning Noon Evening Bedtime  
 ibuprofen 600 mg tablet Commonly known as:  MOTRIN Your last dose was: Your next dose is: Take 1 Tab by mouth every six (6) hours as needed. 600 mg CONTINUE taking these medications Instructions Each Dose to Equal  
 Morning Noon Evening Bedtime  
 ferrous sulfate 325 mg (65 mg iron) tablet Your last dose was: Your next dose is: Take  by mouth Daily (before breakfast). PRENATAL DHA+COMPLETE PRENATAL -300 mg-mcg-mg Cmpk Generic drug:  [id number] candice-folic-dha Your last dose was: Your next dose is: Take  by mouth. Where to Get Your Medications These medications were sent to [facility] [address], [address]  [address] [place] [address], [address] [place] [address] Way [address] Phone:  [phone]  
  ibuprofen 600 mg tablet Discharge Instructions DISCHARGE SUMMARY from Nurse PATIENT INSTRUCTIONS: 
 
 
F-face looks uneven A-arms unable to move or move unevenly S-speech slurred or non-existent T-time-call 911 as soon as signs and symptoms begin-DO NOT go Back to bed or wait to see if you get better-TIME IS BRAIN. Warning Signs of HEART ATTACK Call 911 if you have these symptoms: 
? Chest discomfort. Most heart attacks involve discomfort in the center of the chest that lasts more than a few minutes, or that goes away and comes back. It can feel like uncomfortable pressure, squeezing, fullness, or pain. ? Discomfort in other areas of the upper body. Symptoms can include pain or discomfort in one or both arms, the back, neck, jaw, or stomach. ? Shortness of breath with or without chest discomfort. ? Other signs may include breaking out in a cold sweat, nausea, or lightheadedness. Don't wait more than five minutes to call [address]! Fast action can save your life. Calling 911 is almost always the fastest way to get lifesaving treatment. Emergency Medical Services staff can begin treatment when they arrive  up to an hour sooner than if someone gets to the hospital by car. The discharge information has been reviewed with the patient. The patient verbalized understanding. Discharge medications reviewed with the patient and appropriate educational materials and side effects teaching were provided. ___________________________________________________________________________________________________________________________________ Obstetrical Discharge Summary Name: Jorge Reina MRN: [id number]  SSN: [id number] YOB: 1990  Age: 34 y.o. Sex: female Allergies: Review of patient's allergies indicates no known allergies. Admit Date: 3/21/2018 Discharge Date: 3/23/2018 Admitting Physician: Dorina Dumont MD  
 
Attending Physician:  Dorina Dumont MD  
 
* Admission Diagnoses: INDUCTION Normal labor 40 weeks gestation of pregnancy 
intrauterine pregnancy * Discharge Diagnoses:  
Information for the patient's :  Crow Velázquez [[id number]] Delivery of a 3.33 kg female infant via  on 3/21/2018 at 5:41 PM  by . Apgars were 8 and 9. Additional Diagnoses:  
Hospital Problems as of 3/23/2018  Date Reviewed: 3/21/2018 Codes Class Noted - Resolved POA Normal labor ICD-10-CM: O80, Z37.9 ICD-9-CM: 889  3/21/2018 - Present Unknown 40 weeks gestation of pregnancy ICD-10-CM: Z3A.40 
ICD-9-CM: V22.2  3/21/2018 - Present Unknown Lab Results Component Value Date/Time ABO/Rh(D) O POSITIVE 2018 02:15 AM  
 Rubella, External Immune 2017 GrBStrep, External positive 2018 ABO,Rh O+ 2017 Immunization History Administered Date(s) Administered  Influenza Vaccine (Quad) PF 2017  Tdap 2018 * Procedures:  
Procedure(s):  SECTION [place]  Depression Scale I have been able to laugh and see the funny side of things: As much as I always could I have looked forward with enjoyment to things: As much as I ever did I have blamed myself unnecessarily when things went wrong: Not very often I have been anxious or worried for no good reason: No, not at all I have felt scared or panicky for no very good reason: No, not at all Things have been getting on top of me: No, I have been coping as well as ever I have been so unhappy that I have had difficulty sleeping: No, not at all I have felt sad or miserable: No, not at all I have been so unhappy that I have been crying: No, never The thought of harming myself has occurred to me: Never Total Score: 1 
 
* Discharge Condition: good [facility] Course: Normal hospital course following the delivery. * Disposition: Home Discharge Medications: * Follow-up Care/Patient Instructions: Activity: Activity as tolerated Diet: Regular Diet Wound Care: Keep wound clean and dry Follow-up Information Follow up With Details Comments Contact Selam Rodríguez MD In 6 weeks Patient to call and make follow up appointment for a 6 week check up  [address] [facility] [address] [place] [address] 
[phone] Signed By:  Meme Langston RN   
 March 23, 2018 Discharge instruction to follow: Activity: Pelvis rest for 6 weeks No heavy lifting over 15 lbs for 2 weeks No driving for 2 weeks No push/pull motion such as sweeping or vacuuming for 2 weeks No tub baths for 6 weeks Continue using the hygenique wand after each void or bowel movement. If using sitz bath continue until comfortable stopping. If using mone-bottle continue to use until comfortable stopping. Change sanitary pad after each urination or bowel movement. Call MD for the following: 
    Fever over 101 F; pain not relieved by medication; foul smelling vaginal discharge or an increase in vaginal bleeding. Take medication as prescribed. Follow up with MD as order. Vaginal Childbirth: Care Instructions Your Care Instructions Your body will slowly heal in the next few weeks. It is easy to get too tired and overwhelmed during the first weeks after your baby is born. Changes in your hormones can shift your mood without warning. You may find it hard to meet the extra demands on your energy and time. Take it easy on yourself. Follow-up care is a key part of your treatment and safety.  Be sure to make and go to all appointments, and call your doctor if you are having problems. It's also a good idea to know your test results and keep a list of the medicines you take. How can you care for yourself at home? · Vaginal bleeding and cramps ¨ After delivery, you will have a bloody discharge from the vagina. This will turn pink within a week and then white or yellow after about 10 days. It may last for 2 to 4 weeks or longer, until the uterus has healed. Use pads instead of tampons until you stop bleeding. ¨ Do not worry if you pass some blood clots, as long as they are smaller than a golf ball. If you have a tear or stitches in your vaginal area, change the pad at least every 4 hours to prevent soreness and infection. ¨ You may have cramps for the first few days after childbirth. These are normal and occur as the uterus shrinks to normal size. Take an over-the-counter pain medicine, such as acetaminophen (Tylenol), ibuprofen (Advil, Motrin), or naproxen (Aleve), for cramps. Read and follow all instructions on the label. Do not take aspirin, because it can cause more bleeding. ¨ Do not take two or more pain medicines at the same time unless the doctor told you to. Many pain medicines have acetaminophen, which is Tylenol. Too much acetaminophen (Tylenol) can be harmful. · Stitches ¨ If you have stitches, they will dissolve on their own and do not need to be removed. Follow your doctor's instructions for cleaning the stitched area. ¨ Put ice or a cold pack on your painful area for 10 to 20 minutes at a time, several times a day, for the first few days. Put a thin cloth between the ice and your skin. ¨ Sit in a few inches of warm water (sitz bath) 3 times a day and after bowel movements. The warm water helps with pain and itching. If you do not have a tub, a warm shower might help. · Breast fullness ¨ Your breasts may overfill (engorge) in the first few days after delivery. To help milk flow and to relieve pain, warm your breasts in the shower or by using warm, moist towels before nursing. ¨ If you are not nursing, do not put warmth on your breasts or touch your breasts. Wear a tight bra or sports bra and use ice until the fullness goes away. This usually takes 2 to 3 days. ¨ Put ice or a cold pack on your breast after nursing to reduce swelling and pain. Put a thin cloth between the ice and your skin. · Activity ¨ Eat a balanced diet. Do not try to lose weight by cutting calories. Keep taking your prenatal vitamins, or take a multivitamin. ¨ Get as much rest as you can. Try to take naps when your baby sleeps during the day. ¨ Get some exercise every day. But do not do any heavy exercise until your doctor says it is okay. ¨ Wait until you are healed (about 4 to 6 weeks) before you have sexual intercourse. Your doctor will tell you when it is okay to have sex. ¨ Talk to your doctor about birth control. You can get pregnant even before your period returns. Also, you can get pregnant while you are breastfeeding. · Mental health ¨ It is normal to have some sadness, anxiety, sleeplessness, and mood swings after you go home. If you feel upset or hopeless for more than a few days or are having trouble doing the things you need to do, talk to your doctor. · Constipation and hemorrhoids ¨ Drink plenty of fluids, enough so that your urine is light yellow or clear like water. If you have kidney, heart, or liver disease and have to limit fluids, talk with your doctor before you increase the amount of fluids you drink. ¨ Eat plenty of fiber each day. Have a bran muffin or bran cereal for breakfast, and try eating a piece of fruit for a mid-afternoon snack. ¨ For painful, itchy hemorrhoids, put ice or a cold pack on the area several times a day for 10 minutes at a time.  Follow this by putting a warm compress on the area for another 10 to 20 minutes or by sitting in a shallow, warm bath. When should you call for help? Call 911 anytime you think you may need emergency care. For example, call if: 
? · You passed out (lost consciousness). ?Call your doctor now or seek immediate medical care if: 
? · You have severe vaginal bleeding. ? · You are dizzy or lightheaded, or you feel like you may faint. ? · You have a fever. ? · You have new or more pain in your belly or pelvis. ? Watch closely for changes in your health, and be sure to contact your doctor if: 
? · Your vaginal bleeding seems to be getting heavier. ? · You have new or worse vaginal discharge. ? · You feel sad, anxious, or hopeless for more than a few days. ? · You do not get better as expected. Where can you learn more? Go to http://cate-asa.info/. Enter E162 in the search box to learn more about \"Vaginal Childbirth: Care Instructions. \" Current as of: March 16, 2017 Content Version: 11.4 © 5001-3658 [employer]. Care instructions adapted under license by [employer] (which disclaims liability or warranty for this information). If you have questions about a medical condition or this instruction, always ask your healthcare professional. [address] any warranty or liability for your use of this information. Introducing [facility]! Dear Leopold Gunther: Thank you for requesting a Selligy account. Our records indicate that you already have an active Selligy account. You can access your account anytime at [email or web address] Did you know that you can access your hospital and ER discharge instructions at any time in Selligy? You can also review all of your test results from your hospital stay or ER visit. Additional Information If you have questions, please visit the Frequently Asked Questions section of the Selligy website at [email or web address] Remember, MyChart is NOT to be used for urgent needs. For medical emergencies, dial 911. Now available from your iPhone and Android! Providers Seen During Your Hospitalization Provider Specialty Primary office phone Preet Mccormack MD Obstetrics & Gynecology [phone] Immunizations Administered for This Admission Name Date Influenza Vaccine (Quad) PF  Deferred () MMR  Deferred () Tdap  Deferred (),  Deferred () Your Primary Care Physician (PCP) Primary Care Physician Office Phone Office Fax NONE ** None ** ** None ** You are allergic to the following No active allergies Recent Documentation Breastfeeding? OB Status Smoking Status Unknown Recent pregnancy Former Smoker Emergency Contacts Name Discharge Info Relation Home Work Mobile Toya Solano  Spouse [3] [phone] Patient Belongings The following personal items are in your possession at time of discharge: 
  Dental Appliances: None  Visual Aid: None      Home Medications: None   Jewelry: None  Clothing: At bedside, Pants, Footwear, Shirt, Undergarments, With patient, Sweater, Pajamas, Socks, Slippers    Other Valuables: [employer], Geryl Bidding, Wallet  Personal Items Sent to Safe: none Please provide this summary of care documentation to your next provider. Signatures-by signing, you are acknowledging that this After Visit Summary has been reviewed with you and you have received a copy. Patient Signature:  ____________________________________________________________ Date:  ____________________________________________________________  
  
Светлана Payor Provider Signature:  ____________________________________________________________ Date:  ____________________________________________________________

## 2018-03-21 NOTE — H&P
Tyrel Kelly OB H&P      Assessment/Plan    Problem List  Date Reviewed: 3/19/2018          Codes Class    Normal labor ICD-10-CM: O80, Z37.9  ICD-9-CM: 859         07 weeks gestation of pregnancy ICD-10-CM: Z3A.40  ICD-9-CM: V22.2         Fetal size inconsistent with dates ICD-10-CM: O26.849  ICD-9-CM: 649.60     Overview Addendum 3/19/2018  7:45 AM by Danay Lindo NP     3/15/2018 : FH = 45, EFW 35%, vertex, TIMUR nl               Anemia affecting third pregnancy ICD-10-CM: O99.019, O09.40  ICD-9-CM: 648.23     Overview Signed 3/9/2018 10:44 AM by Danay Lindo NP     Iron BID             GBS carrier ICD-10-CM: Z22.330  ICD-9-CM: V02.51     Overview Signed 3/7/2018 11:46 AM by Danay Lindo NP     Tx intrapartum             Primigravida, third trimester ICD-10-CM: Z34.03  ICD-9-CM: V22.0     Overview Addendum 3/9/2018 10:53 AM by Daany Lindo NP     NORAH 3/19/2018 by [address] [facility]  [facility] 18 U/S growth at 39%    Plans breastfeeding/Nexplanon               Abdominal pain in pregnancy, third trimester ICD-10-CM: O26.893, R10.9  ICD-9-CM: 646.83, 789.00     Overview Signed 3/7/2018 11:45 AM by Danay Lindo NP     Dx  at [facility] [address] bladder with biliary sludge, Neg sonographic Casanova sign. Referral to surgery done by [id number] [address] 34 y.o.  40w2d  presented to L&D for postdates IOL. Pt has no complaints today. Pt denies vaginal bleeding/discharge. No LOF.  +FM. OB History    Para Term  AB Living   1 0 0 0 0    SAB TAB Ectopic Molar Multiple Live Births   0 0 0 0        # Outcome Date GA Lbr Claudy/2nd Weight Sex Delivery Anes PTL Lv   1 Current                   Past Medical History:   Diagnosis Date    Epigastric abdominal pain 2018    Obesity affecting pregnancy in second trimester        No past surgical history on file.     Family History   Problem Relation Age of Onset    Diabetes Maternal Grandmother     Cancer Maternal Grandmother     No Known Problems Mother     No Known Problems Father        Social History     Social History    Marital status:      Spouse name: N/A    Number of children: N/A    Years of education: N/A     Occupational History    Not on file. Social History Main Topics    Smoking status: Former Smoker     Quit date: 9/8/2017    Smokeless tobacco: Never Used    Alcohol use No    Drug use: No    Sexual activity: Yes     Partners: Male     Birth control/ protection: None     Other Topics Concern    Caffeine Concern No    Exercise No    Seat Belt Yes    Self-Exams Yes     Social History Narrative    ** Merged History Encounter **    Abuse: Feels safe at home, no history of physical abuse, no history of sexual abuse           No Known Allergies      Review of Systems:    Constitutional: No fevers or chills     Prenatal: + fetal movement, no VB/DC, no LOF     CV: No chest pain or palpatations    Resp: No SOB or cough    GI: No nausea/vomiting/diarrhea/constipation    Neuro: No HA, no seizure like activity    Skin: No rashes or lesions     Breast: No breast pain    : No dysuria or hematuria      Prenatal Record Review    The prenatal record has been reviewed.     Prenatal Labs:   Lab Results   Component Value Date/Time    Rubella, External Immune 09/08/2017    GrBStrep, External positive 02/22/2018    HBsAg, External NR 09/08/2017    HIV, External NR 09/08/2017    RPR, External NR 09/08/2017    Gonorrhea, External negative 09/29/2017    Chlamydia, External negative 09/29/2017       Objective    Visit Vitals    Breastfeeding No         Obstetric Exam    SVE: 2/80/-2    Physical Exam    Gen: alert and cooperative, NAD    HEENT: NCAT    CV: RRR    RESP: CTA bilat    ABD: Gravid, soft, NT    EXT: trace edema bilat    NEURO: No focal deficits    SKIN: No noted rashes or lesions       Pauline Leo MD  7:54 AM  03/21/18

## 2018-03-21 NOTE — PROGRESS NOTES
Pt presented in labor and delivery with complaints of contractions. Scheduled for early AM induction. Pt admitted and IV started. Consents signed and labs drawn. PCN ordered for +GBS status. Reivewed pt's plans of care for delivery. Pt plans on breastfeeding exclusively. answered questions for patient.

## 2018-03-21 NOTE — PROGRESS NOTES
Pitocin decreased to 2 mu/min. d5lr increased to 150 ml/hr. Pt remains on her right side / peanut applied again. Pt asleep and comfortable.  98.3 oral temp

## 2018-03-21 NOTE — PROGRESS NOTES
Pt repositioned to the left side. o2 back in place. Sve: 5-6cms, pt placed on the peanut on her left side now.

## 2018-03-21 NOTE — PROGRESS NOTES
Dr Cathy Askew at bs. Sve: ant lip/c/0, +1 with a contraction. o2 in place. Pericare/pad change. Pt repositioned back to right side and back on the peanut.

## 2018-03-21 NOTE — PROGRESS NOTES
03/21/18 0600   Cervical Exam   Dilation (cm) 3   Eff 90 %   Station -2   Cervical Consistency Soft   Vaginal exam done by?   Jeanie Major Rn   Baby Position Vertex

## 2018-03-21 NOTE — IP AVS SNAPSHOT
[address] [place]  
[phone] Patient: Gilles Wilkinson MRN: [id number] BSQ:8/76/0073 A check dilcia indicates which time of day the medication should be taken. My Medications START taking these medications Instructions Each Dose to Equal  
 Morning Noon Evening Bedtime  
 ibuprofen 600 mg tablet Commonly known as:  MOTRIN Your last dose was: Your next dose is: Take 1 Tab by mouth every six (6) hours as needed. 600 mg CONTINUE taking these medications Instructions Each Dose to Equal  
 Morning Noon Evening Bedtime  
 ferrous sulfate 325 mg (65 mg iron) tablet Your last dose was: Your next dose is: Take  by mouth Daily (before breakfast). PRENATAL DHA+COMPLETE PRENATAL -300 mg-mcg-mg Cmpk Generic drug:  [id number] candice-folic-dha Your last dose was: Your next dose is: Take  by mouth. Where to Get Your Medications These medications were sent to [facility] [address], [address] [address] Phone:  [phone]  
  ibuprofen 600 mg tablet

## 2018-03-21 NOTE — PROGRESS NOTES
Back to bed in a left tilt position. efm reapplied. fhr 120's. Pericare/pad change.  Meconium noted on towel

## 2018-03-21 NOTE — PROGRESS NOTES
SBAR OUT Report: Mother    Verbal report given to Blake Jarrell RN (full name & credentials) on this patient, who is now being transferred to [phone] (unit) for routine progression of care. The patient is not wearing a green \"Anesthesia-Duramorph\" band. Report consisted of patient's Situation, Background, Assessment and Recommendations (SBAR). [place] ID bands were compared with the identification form, and verified with the patient and receiving nurse. Information from the SBAR, Procedure Summary, Intake/Output, MAR and Recent Results and the [place] Report was reviewed with the receiving nurse; opportunity for questions and clarification provided.

## 2018-03-21 NOTE — PROGRESS NOTES
Subjective: Pt tolerating labor well.     Objective:    Vitals:    18 1448 18 1504 18 1518 18 1547   BP: 150/86 184/90 128/79 105/60   Pulse: 89 86 94 75   Temp:           FHT's:  Baseline -130's, reactive  [place]:  Ctx q 2-3 min    SVE:  Ant lip/100/+2    Assessement and Plan: Yves Kelsey 34 y.o.  at 40w2d admitted for postdates IOL    Continue pitocin augmentation     Pain control: epidural    GBS: positive - being treated      Courtney De Jesus MD    4:26 PM    18

## 2018-03-21 NOTE — ANESTHESIA PREPROCEDURE EVALUATION
Anesthetic History   No history of anesthetic complications            Review of Systems / Medical History  Patient summary reviewed, nursing notes reviewed and pertinent labs reviewed    Pulmonary          Smoker (quit 9 months ago.)         Neuro/Psych   Within defined limits           Cardiovascular  Within defined limits                Exercise tolerance: >4 METS     GI/Hepatic/Renal     GERD: well controlled           Endo/Other        Morbid obesity     Other Findings   Comments: Term preg. , active labor.            Physical Exam    Airway  Mallampati: II  TM Distance: 4 - 6 cm  Neck ROM: normal range of motion   Mouth opening: Normal     Cardiovascular    Rhythm: regular           Dental  No notable dental hx       Pulmonary                 Abdominal         Other Findings            Anesthetic Plan    ASA: 3  Anesthesia type: epidural            Anesthetic plan and risks discussed with: Patient and Family

## 2018-03-21 NOTE — PROGRESS NOTES
Subjective: Pt tolerating labor well. Objective: There were no vitals filed for this visit.     FHT's:  Baseline -130's, reactive  [place]:  irreg ctx    SVE:  4-5//-1  Membranes:  AROM - light mec    Assessement and Plan: Matthew Kothari 34 y.o.  at 40w2d admitted for postdates IOL    Continue pitocin augmentation     Pain control: none    GBS: POS - being treated      Nima Krueger MD    7:55 AM    18

## 2018-03-21 NOTE — PROGRESS NOTES
Dr Garett Lee at . e. 6cms. fse and iupc placed without difficutly. Pt back on the peanut on her right side and resting quietly. pcn #3 infusing now.

## 2018-03-21 NOTE — ANESTHESIA PROCEDURE NOTES
Epidural Block    Start time: 3/21/2018 8:23 AM  End time: 3/21/2018 8:31 AM  Performed by: Joce Herrera  Authorized by: Joce Herrera     Pre-Procedure  Indication: labor epidural    Preanesthetic Checklist: patient identified, risks and benefits discussed, anesthesia consent, patient being monitored, timeout performed and anesthesia consent    Timeout Time: 08:23        Epidural:   Patient position:  Seated  Prep region:  Lumbar  Prep: Patient draped and Chlorhexidine    Location:  L3-4    Needle and Epidural Catheter:   Needle Type:  Tuohy  Needle Gauge:  17 G  Injection Technique:  Loss of resistance using air  Attempts:  1  Catheter Size:  19 G  Events: no blood with aspiration, no cerebrospinal fluid with aspiration, no paresthesia and negative aspiration test    Test Dose:  Lidocaine 1.5% w/ epi and negative    Assessment:   Catheter Secured:  Tegaderm and tape  Insertion:  Uncomplicated  Patient tolerance:  Patient tolerated the procedure well with no immediate complications  All needles out intact, procedure tolerated well without problems

## 2018-03-22 PROCEDURE — 65270000029 HC RM PRIVATE

## 2018-03-22 PROCEDURE — 74011250637 HC RX REV CODE- 250/637: Performed by: OBSTETRICS & GYNECOLOGY

## 2018-03-22 RX ADMIN — IBUPROFEN 600 MG: 600 TABLET, FILM COATED ORAL at 15:29

## 2018-03-22 RX ADMIN — DOCUSATE SODIUM 100 MG: 100 CAPSULE, LIQUID FILLED ORAL at 22:06

## 2018-03-22 RX ADMIN — IBUPROFEN 600 MG: 600 TABLET, FILM COATED ORAL at 22:06

## 2018-03-22 RX ADMIN — DOCUSATE SODIUM 100 MG: 100 CAPSULE, LIQUID FILLED ORAL at 08:57

## 2018-03-22 RX ADMIN — PRENATAL VIT W/ FE FUMARATE-FA TAB 27-0.8 MG 1 TABLET: 27-0.8 TAB at 08:57

## 2018-03-22 RX ADMIN — IBUPROFEN 600 MG: 600 TABLET, FILM COATED ORAL at 08:57

## 2018-03-22 NOTE — ROUTINE PROCESS
SBAR IN Report: Mother    Verbal report received from José Santos RN (full name & credentials) on this patient, who is now being transferred to MIU (unit) for routine progression of care. The patient is not wearing a green \"Anesthesia-Duramorph\" band. Report consisted of patient's Situation, Background, Assessment and Recommendations (SBAR).  ID bands were compared with the identification form, and verified with the patient and transferring nurse. Information from the SBAR and the [place] Report was reviewed with the transferring nurse; opportunity for questions and clarification provided.

## 2018-03-22 NOTE — PROGRESS NOTES
Chart reviewed - no needs identified.  met with family and provided education/pamphlet on [facility] Postpartum [place] Home Visit. Family would like to receive home visit. Referral will be made at discharge.     Eli Faria, [address] [facility]

## 2018-03-22 NOTE — PROGRESS NOTES
Admission assessment completed. See flow sheet. Patient is orientated to room and call light. Questions are encouraged and answered with patient. Instructed patient to call out with any needs and with assistance OOB to bathroom to void. Patient verbalized understanding.

## 2018-03-22 NOTE — ANESTHESIA POSTPROCEDURE EVALUATION
Post-Anesthesia Evaluation and Assessment    Patient: Jeronimo Hua MRN: [id number]  SSN: [id number]    YOB: 1990  Age: 34 y.o. Sex: female       Cardiovascular Function/Vital Signs  Visit Vitals    /63 (BP 1 Location: Right arm, BP Patient Position: Sitting)    Pulse 69    Temp 36.4 °C (97.6 °F)    Resp 18    Breastfeeding Unknown       Patient is status post epidural anesthesia for labor and vaginal delivery. Nausea/Vomiting: None    Postoperative hydration reviewed and adequate. Pain:  Pain Scale 1: Numeric (0 - 10) (03/22/18 0420)  Pain Intensity 1: 0 (03/22/18 0420)   Managed    Neurological Status:   Neuro (WDL): Within Defined Limits (03/21/18 1801)   At baseline    Mental Status and Level of Consciousness: Arousable    Pulmonary Status:   O2 Device: Room air (03/21/18 1930)   Adequate oxygenation and airway patent    Complications related to anesthesia: None    Post-anesthesia assessment completed. No concerns. The patient was satisfied with her labor epidural and denies any complications. Her lower extremities have returned to baseline neurologically.     Signed By: Jason Rolle MD     March 22, 2018

## 2018-03-22 NOTE — LACTATION NOTE

## 2018-03-22 NOTE — PROGRESS NOTES
This RN assisted patient OOB to bathroom. Patient voided 600ml and was taught how to provide mone care. Patient tolerated well and returned to bed with no assistance. Instructed patient to call out with any further assistance OOB. Patient verbalized understanding.

## 2018-03-22 NOTE — PROGRESS NOTES
Patient is S/P vaginal delivery at 40 2/7 weeks, postdates IOL. No complaints today. Lochia < menses. No GI/ issues. No F/C. Visit Vitals    BP 91/55 (BP 1 Location: Left arm, BP Patient Position: At rest;Sitting)    Pulse 69    Temp 97.9 °F (36.6 °C)    Resp 16    Breastfeeding Unknown        CV - RRR  LUNGS - CTA bilaterally  ABD - soft, approp tend, FF below umbilicus  EXT - tr edema bilaterally          Labs:    Recent Results (from the past 24 hour(s))   RT--CORD BLOOD GAS    Collection Time: 18  5:41 PM   Result Value Ref Range    PH,CORD BLD ARTERIAL 7.204 (L) 7.21 - 7.31      PCO2,CORD BLD ARTERIAL 62 (H) 33 - 49 mmHg    PO2,CORD BLD ARTERIAL 22 (H) 9 - 19 mmHg    HCO3,CORD BLD ARTERIAL 24 22 - 26 mmol/L    BASE DEFICIT,CBA 5.3 (H) 0.0 - 2.0 mmol/L    SITE CORD     Respiratory comment: na at 3 21 2018 5 54 07 PM. Not read back. CORD BLOOD GAS VENOUS    Collection Time: 18  5:41 PM   Result Value Ref Range    PH,CORD BLD VENOUS 7.287 7.2 - 7.44      PCO2,CORD BLD VENOUS 46 (H) 14.1 - 43.3 mmHg    PO2,CORD BLD VENOUS 28 (L) 30.4 - 57.2 mmHg    HCO3,CORD BLD VENOUS 22 mmol/L    BASE DEFICIT,CBV 5.2 1.9 - 7.7 mmol/L    SITE CORD     Respiratory comment: na at 3 21 2018 5 57 32 PM. Not read back. PPD #1      Pt is breast feeding and plans on POP for birth control. Stable.   Routine PP care      Monie Rojo MD  8:24 AM  18

## 2018-03-22 NOTE — LACTATION NOTE
In to see mom and baby for lactation visit. First time mom reports baby has nursed a few times, mostly on the right. Assisted with attempt on left in football but baby can't maintain latch. Latched well in football on right x 20 minutes. Appeared to be latched deeply. Manual lip flange demonstrated. After burping, attempted in football again on left and baby latched and nursed for 15 minutes. Mom reports initial latch-on pain but then it improves. Gave lanolin with instructions. Discussed cluster feeding and 2nd night expectations. Discussed importance of frequent feedings, at least 8 feedings in 24 hours or more with feeding cues, waking if necessary. Reviewed packet in detail and answered multiple questions. Lactation to follow tomorrow.

## 2018-03-22 NOTE — PROGRESS NOTES
Bedside report completed with [facility] MARYAN PADRON. Plan of care reviewed with patient, verbalized understanding. Care assumed.

## 2018-03-23 VITALS
SYSTOLIC BLOOD PRESSURE: 114 MMHG | TEMPERATURE: 98.9 F | OXYGEN SATURATION: 98 % | DIASTOLIC BLOOD PRESSURE: 71 MMHG | RESPIRATION RATE: 16 BRPM | HEART RATE: 65 BPM

## 2018-03-23 PROCEDURE — 74011250637 HC RX REV CODE- 250/637: Performed by: OBSTETRICS & GYNECOLOGY

## 2018-03-23 RX ORDER — IBUPROFEN 600 MG/1
600 TABLET ORAL
Qty: 60 TAB | Refills: 1 | Status: SHIPPED | OUTPATIENT
Start: 2018-03-23 | End: 2018-10-05

## 2018-03-23 RX ADMIN — PRENATAL VIT W/ FE FUMARATE-FA TAB 27-0.8 MG 1 TABLET: 27-0.8 TAB at 10:44

## 2018-03-23 RX ADMIN — IBUPROFEN 600 MG: 600 TABLET, FILM COATED ORAL at 04:14

## 2018-03-23 RX ADMIN — IBUPROFEN 600 MG: 600 TABLET, FILM COATED ORAL at 10:44

## 2018-03-23 NOTE — PROGRESS NOTES
Patient is S/P vaginal delivery at 40 2/7 weeks, postdates IOL. No complaints today. Lochia < menses. No GI/ issues. No F/C. Visit Vitals    /71 (BP 1 Location: Left arm, BP Patient Position: Sitting)    Pulse 65    Temp 98.9 °F (37.2 °C)    Resp 16    SpO2 98%    Breastfeeding Unknown        CV - RRR  LUNGS - CTA bilaterally  ABD - soft, approp tend, FF below umbilicus  EXT - tr edema bilaterally          Labs:  No results found for this or any previous visit (from the past 24 hour(s)). PPD #2      Pt is breast feeding and plans on POP for birth control. Stable.   Routine PP instructions      Raven Villatoro MD  9:27 AM  18

## 2018-03-23 NOTE — PROGRESS NOTES
Referral made to [facility]  home visit program.    Brooklyn Dee, [address] [facility]

## 2018-03-23 NOTE — PROGRESS NOTES
Motrin 600 mg PO given to pt per request.  Educated pt to call out if pain medication does not help.   Pt given scheduled PNV PO.

## 2018-03-23 NOTE — PROGRESS NOTES
Assessment complete, see doc flowsheets. Patient sitting up in bed, no distress noted. Patient denies pain and other needs at this time.

## 2018-03-23 NOTE — DISCHARGE INSTRUCTIONS
DISCHARGE SUMMARY from Nurse    PATIENT INSTRUCTIONS:    After general anesthesia or intravenous sedation, for 24 hours or while taking prescription Narcotics:  · Limit your activities  · Do not drive and operate hazardous machinery  · Do not make important personal or business decisions  · Do  not drink alcoholic beverages  · If you have not urinated within 8 hours after discharge, please contact your surgeon on call. Report the following to your surgeon:  · Excessive pain, swelling, redness or odor of or around the surgical area  · Temperature over 100.5  · Nausea and vomiting lasting longer than 4 hours or if unable to take medications  · Any signs of decreased circulation or nerve impairment to extremity: change in color, persistent  numbness, tingling, coldness or increase pain  · Any questions    What to do at Home:  Recommended activity: Activity as tolerated,         *  Please give a list of your current medications to your Primary Care Provider. *  Please update this list whenever your medications are discontinued, doses are      changed, or new medications (including over-the-counter products) are added. *  Please carry medication information at all times in case of emergency situations. These are general instructions for a healthy lifestyle:    No smoking/ No tobacco products/ Avoid exposure to second hand smoke  Surgeon General's Warning:  Quitting smoking now greatly reduces serious risk to your health. Obesity, smoking, and sedentary lifestyle greatly increases your risk for illness    A healthy diet, regular physical exercise & weight monitoring are important for maintaining a healthy lifestyle    You may be retaining fluid if you have a history of heart failure or if you experience any of the following symptoms:  Weight gain of 3 pounds or more overnight or 5 pounds in a week, increased swelling in our hands or feet or shortness of breath while lying flat in bed.   Please call your doctor as soon as you notice any of these symptoms; do not wait until your next office visit. Recognize signs and symptoms of STROKE:    F-face looks uneven    A-arms unable to move or move unevenly    S-speech slurred or non-existent    T-time-call 911 as soon as signs and symptoms begin-DO NOT go       Back to bed or wait to see if you get better-TIME IS BRAIN. Warning Signs of HEART ATTACK     Call 911 if you have these symptoms:   Chest discomfort. Most heart attacks involve discomfort in the center of the chest that lasts more than a few minutes, or that goes away and comes back. It can feel like uncomfortable pressure, squeezing, fullness, or pain.  Discomfort in other areas of the upper body. Symptoms can include pain or discomfort in one or both arms, the back, neck, jaw, or stomach.  Shortness of breath with or without chest discomfort.  Other signs may include breaking out in a cold sweat, nausea, or lightheadedness. Don't wait more than five minutes to call 911 - MINUTES MATTER! Fast action can save your life. Calling 911 is almost always the fastest way to get lifesaving treatment. Emergency Medical Services staff can begin treatment when they arrive -- up to an hour sooner than if someone gets to the hospital by car. The discharge information has been reviewed with the patient. The patient verbalized understanding. Discharge medications reviewed with the patient and appropriate educational materials and side effects teaching were provided. ___________________________________________________________________________________________________________________________________    Obstetrical Discharge Summary     Name: Franck Espinoza MRN: [id number]  SSN: [id number]    YOB: 1990  Age: 34 y.o. Sex: female      Allergies: Review of patient's allergies indicates no known allergies.     Admit Date: 3/21/2018    Discharge Date: 3/23/2018     Admitting Physician: Sylvia Mosqueda Roberto Disla MD     Attending Physician:  Anastacio Moy MD     * Admission Diagnoses: INDUCTION  Normal labor  40 weeks gestation of pregnancy  intrauterine pregnancy    * Discharge Diagnoses:   Information for the patient's :  Sean Obando [[id number]]   Delivery of a 3.33 kg female infant via  on 3/21/2018 at 5:41 PM  by . Apgars were 8 and 9. Additional Diagnoses:   Hospital Problems as of 3/23/2018  Date Reviewed: 3/21/2018          Codes Class Noted - Resolved POA    Normal labor ICD-10-CM: O80, Z37.9  ICD-9-CM: 241  3/21/2018 - Present Unknown        40 weeks gestation of pregnancy ICD-10-CM: Z3A.40  ICD-9-CM: V22.2  3/21/2018 - Present Unknown             Lab Results   Component Value Date/Time    ABO/Rh(D) O POSITIVE 2018 02:15 AM    Rubella, External Immune 2017    GrBStrep, External positive 2018    ABO,Rh O+ 2017      Immunization History   Administered Date(s) Administered    Influenza Vaccine (Quad) PF 2017    Tdap 2018       * Procedures:   Procedure(s):   SECTION      [place]  Depression Scale  I have been able to laugh and see the funny side of things: As much as I always could  I have looked forward with enjoyment to things: As much as I ever did  I have blamed myself unnecessarily when things went wrong: Not very often  I have been anxious or worried for no good reason: No, not at all  I have felt scared or panicky for no very good reason: No, not at all  Things have been getting on top of me: No, I have been coping as well as ever  I have been so unhappy that I have had difficulty sleeping: No, not at all  I have felt sad or miserable: No, not at all  I have been so unhappy that I have been crying: No, never  The thought of harming myself has occurred to me: Never  Total Score: 1    * Discharge Condition: good    * Hospital Course: Normal hospital course following the delivery.     * Disposition: Home    Discharge Medications:         * Follow-up Care/Patient Instructions: Activity: Activity as tolerated  Diet: Regular Diet  Wound Care: Keep wound clean and dry    Follow-up Information     Follow up With Details Comments [address] [place], [place] In 6 weeks Patient to call and make follow up appointment for a 6 week check up  [address]  [phone]             Signed By:  Andreas Templeton RN     March 23, 2018           Discharge instruction to follow: Activity: Pelvis rest for 6 weeks     No heavy lifting over 15 lbs for 2 weeks     No driving for 2 weeks     No push/pull motion such as sweeping or vacuuming for 2 weeks     No tub baths for 6 weeks    Continue using the hygenique wand after each void or bowel movement. If using sitz bath continue until comfortable stopping. If using mone-bottle continue to use until comfortable stopping. Change sanitary pad after each urination or bowel movement. Call MD for the following:      Fever over 101 F; pain not relieved by medication; foul smelling vaginal discharge or an increase in vaginal bleeding. Take medication as prescribed. Follow up with MD as order. Vaginal Childbirth: Care Instructions  Your Care Instructions    Your body will slowly heal in the next few weeks. It is easy to get too tired and overwhelmed during the first weeks after your baby is born. Changes in your hormones can shift your mood without warning. You may find it hard to meet the extra demands on your energy and time. Take it easy on yourself. Follow-up care is a key part of your treatment and safety. Be sure to make and go to all appointments, and call your doctor if you are having problems. It's also a good idea to know your test results and keep a list of the medicines you take. How can you care for yourself at home? · Vaginal bleeding and cramps  ¨ After delivery, you will have a bloody discharge from the vagina.  This will turn pink within a week and then white or yellow after about 10 days. It may last for 2 to 4 weeks or longer, until the uterus has healed. Use pads instead of tampons until you stop bleeding. ¨ Do not worry if you pass some blood clots, as long as they are smaller than a golf ball. If you have a tear or stitches in your vaginal area, change the pad at least every 4 hours to prevent soreness and infection. ¨ You may have cramps for the first few days after childbirth. These are normal and occur as the uterus shrinks to normal size. Take an over-the-counter pain medicine, such as acetaminophen (Tylenol), ibuprofen (Advil, Motrin), or naproxen (Aleve), for cramps. Read and follow all instructions on the label. Do not take aspirin, because it can cause more bleeding. ¨ Do not take two or more pain medicines at the same time unless the doctor told you to. Many pain medicines have acetaminophen, which is Tylenol. Too much acetaminophen (Tylenol) can be harmful. · Stitches  ¨ If you have stitches, they will dissolve on their own and do not need to be removed. Follow your doctor's instructions for cleaning the stitched area. ¨ Put ice or a cold pack on your painful area for 10 to 20 minutes at a time, several times a day, for the first few days. Put a thin cloth between the ice and your skin. ¨ Sit in a few inches of warm water (sitz bath) 3 times a day and after bowel movements. The warm water helps with pain and itching. If you do not have a tub, a warm shower might help. · Breast fullness  ¨ Your breasts may overfill (engorge) in the first few days after delivery. To help milk flow and to relieve pain, warm your breasts in the shower or by using warm, moist towels before nursing. ¨ If you are not nursing, do not put warmth on your breasts or touch your breasts. Wear a tight bra or sports bra and use ice until the fullness goes away. This usually takes 2 to 3 days.   ¨ Put ice or a cold pack on your breast after nursing to reduce swelling and pain. Put a thin cloth between the ice and your skin. · Activity  ¨ Eat a balanced diet. Do not try to lose weight by cutting calories. Keep taking your prenatal vitamins, or take a multivitamin. ¨ Get as much rest as you can. Try to take naps when your baby sleeps during the day. ¨ Get some exercise every day. But do not do any heavy exercise until your doctor says it is okay. ¨ Wait until you are healed (about 4 to 6 weeks) before you have sexual intercourse. Your doctor will tell you when it is okay to have sex. ¨ Talk to your doctor about birth control. You can get pregnant even before your period returns. Also, you can get pregnant while you are breastfeeding. · Mental health  ¨ It is normal to have some sadness, anxiety, sleeplessness, and mood swings after you go home. If you feel upset or hopeless for more than a few days or are having trouble doing the things you need to do, talk to your doctor. · Constipation and hemorrhoids  ¨ Drink plenty of fluids, enough so that your urine is light yellow or clear like water. If you have kidney, heart, or liver disease and have to limit fluids, talk with your doctor before you increase the amount of fluids you drink. ¨ Eat plenty of fiber each day. Have a bran muffin or bran cereal for breakfast, and try eating a piece of fruit for a mid-afternoon snack. ¨ For painful, itchy hemorrhoids, put ice or a cold pack on the area several times a day for 10 minutes at a time. Follow this by putting a warm compress on the area for another 10 to 20 minutes or by sitting in a shallow, warm bath. When should you call for help? Call 911 anytime you think you may need emergency care. For example, call if:  ? · You passed out (lost consciousness). ?Call your doctor now or seek immediate medical care if:  ? · You have severe vaginal bleeding. ? · You are dizzy or lightheaded, or you feel like you may faint. ? · You have a fever.    ? · You have new or more pain in your belly or pelvis. ? Watch closely for changes in your health, and be sure to contact your doctor if:  ? · Your vaginal bleeding seems to be getting heavier. ? · You have new or worse vaginal discharge. ? · You feel sad, anxious, or hopeless for more than a few days. ? · You do not get better as expected. Where can you learn more? Go to http://cate-asa.info/. Enter H722 in the search box to learn more about \"Vaginal Childbirth: Care Instructions. \"  Current as of: March 16, 2017  Content Version: 11.4  © 7978-2981 [employer]. Care instructions adapted under license by [employer] (which disclaims liability or warranty for this information). If you have questions about a medical condition or this instruction, always ask your healthcare professional. [address] any warranty or liability for your use of this information.

## 2018-03-23 NOTE — LACTATION NOTE
Patient requesting to breast pump at this time. Breast pump and supplies taken into patient's room and set-up per patient's request.  Patient assisted with pumping for the first time and assisted with cleaning up supplies. After patient pumped for 15 minutes, no colostrum was found in the pump flange. Patient educated on how often to pump and breast feed her infant. Mother also educated on the amount of colostrum to expect at this time. All questions answered, mother verbalized understanding of teaching.

## 2018-03-24 NOTE — LACTATION NOTE
In to check on feedings. Baby has been latching well per mom. Mom has extra large breasts and short nipples, does take a few minutes for baby to latch per mom. Mom has been using pump some to help ely nipples prior to latch on. Mom has pump for home use. Discussed use of pump prior as needed. If baby unable to stay latched on, would recommend mom pump x 15 minutes and feed baby all pumped milk. Dad asked about formula supplement- not medically indicated at this time. Discussed engorgement precautions. Continue to feed on demand, watch output closely. Reviewed outpatient lactation follow up as needed. All questions answered. Did not observe at this time, mom to call out for feeding observation before discharge if desired.

## 2018-03-29 NOTE — DISCHARGE SUMMARY
Date of Admission:  3/21/2018  2:00 AM  Date of Discharge:  3/23/2018  3:34 PM    Patient Active Problem List   Diagnosis Code    Abdominal pain in pregnancy, third trimester O26.893, R10.9    GBS carrier Z22.330    Primigravida, third trimester Z34.03    Anemia affecting third pregnancy O99.019, O09.40    Fetal size inconsistent with dates O34.0    Normal labor O80, Z37.9    40 weeks gestation of pregnancy Z3A.40       Sarai Amos 34 y.o.  presented at 40w2d for induction  Pt had  without incident. See delivery note for all delivery details. Pt's PP course was uneventful. On day of D/C, she was ambulating well, afebrile, with lochia < menses. She was discharged home with medications as below. Pt was breast feeding on discharge. She plans on POPs for birth control. Routine PP instructions given to patient. She is to follow up with [facility] in 6 weeks for PP exam.    Discharge Medication List as of 3/23/2018  9:39 AM      START taking these medications    Details   ibuprofen (MOTRIN) 600 mg tablet Take 1 Tab by mouth every six (6) hours as needed., Normal, Disp-60 Tab, R-1         CONTINUE these medications which have NOT CHANGED    Details   ferrous sulfate 325 mg (65 mg iron) tablet Take  by mouth Daily (before breakfast). , Historical Med      [id number] candice-folic-dha (PRENATAL DHA+COMPLETE PRENATAL) [id number] mg-mcg-mg cmpk Take  by mouth., Dayana Dorman MD  2:15 PM  18

## 2018-05-02 PROBLEM — O26.849 FETAL SIZE INCONSISTENT WITH DATES: Status: RESOLVED | Noted: 2018-03-15 | Resolved: 2018-05-02

## 2018-05-02 PROBLEM — Z22.330 GBS CARRIER: Status: RESOLVED | Noted: 2018-03-07 | Resolved: 2018-05-02

## 2018-05-02 PROBLEM — R10.9 ABDOMINAL PAIN IN PREGNANCY, THIRD TRIMESTER: Status: RESOLVED | Noted: 2018-01-30 | Resolved: 2018-05-02

## 2018-05-02 PROBLEM — Z34.03 PRIMIGRAVIDA, THIRD TRIMESTER: Status: RESOLVED | Noted: 2018-03-07 | Resolved: 2018-05-02

## 2018-05-02 PROBLEM — Z37.9 NORMAL LABOR: Status: RESOLVED | Noted: 2018-03-21 | Resolved: 2018-05-02

## 2018-05-02 PROBLEM — O26.893 ABDOMINAL PAIN IN PREGNANCY, THIRD TRIMESTER: Status: RESOLVED | Noted: 2018-01-30 | Resolved: 2018-05-02

## 2018-05-02 PROBLEM — Z3A.40 40 WEEKS GESTATION OF PREGNANCY: Status: RESOLVED | Noted: 2018-03-21 | Resolved: 2018-05-02

## 2018-05-02 PROBLEM — O99.019 ANEMIA AFFECTING THIRD PREGNANCY: Status: RESOLVED | Noted: 2018-03-09 | Resolved: 2018-05-02

## 2018-10-05 ENCOUNTER — APPOINTMENT (OUTPATIENT)
Dept: ULTRASOUND IMAGING | Age: 28
End: 2018-10-05
Payer: SELF-PAY

## 2018-10-05 ENCOUNTER — HOSPITAL ENCOUNTER (EMERGENCY)
Age: 28
Discharge: HOME | End: 2018-10-05
Payer: SELF-PAY

## 2018-10-05 VITALS
DIASTOLIC BLOOD PRESSURE: 76 MMHG | WEIGHT: 260 LBS | BODY MASS INDEX: 52.41 KG/M2 | RESPIRATION RATE: 16 BRPM | OXYGEN SATURATION: 99 % | HEIGHT: 59 IN | TEMPERATURE: 98.2 F | HEART RATE: 58 BPM | SYSTOLIC BLOOD PRESSURE: 116 MMHG

## 2018-10-05 DIAGNOSIS — R10.11 ABDOMINAL PAIN, RIGHT UPPER QUADRANT: Primary | ICD-10-CM

## 2018-10-05 LAB
ALBUMIN SERPL-MCNC: 3.4 G/DL (ref 3.5–5)
ALBUMIN/GLOB SERPL: 0.8 {RATIO}
ALP SERPL-CCNC: 113 U/L (ref 50–130)
ALT SERPL-CCNC: 61 U/L (ref 12–65)
ANION GAP SERPL CALC-SCNC: 6 MMOL/L
AST SERPL-CCNC: 105 U/L (ref 15–37)
BASOPHILS # BLD: 0 K/UL (ref 0–0.2)
BASOPHILS NFR BLD: 0 % (ref 0–2)
BILIRUB SERPL-MCNC: 0.5 MG/DL (ref 0.2–1.1)
BUN SERPL-MCNC: 15 MG/DL (ref 6–23)
CALCIUM SERPL-MCNC: 8.5 MG/DL (ref 8.3–10.4)
CHLORIDE SERPL-SCNC: 108 MMOL/L (ref 98–107)
CO2 SERPL-SCNC: 28 MMOL/L (ref 21–32)
CREAT SERPL-MCNC: 0.86 MG/DL (ref 0.6–1)
DIFFERENTIAL METHOD BLD: ABNORMAL
EOSINOPHIL # BLD: 0 K/UL (ref 0–0.8)
EOSINOPHIL NFR BLD: 0 % (ref 0.5–7.8)
ERYTHROCYTE [DISTWIDTH] IN BLOOD BY AUTOMATED COUNT: 14.5 %
GLOBULIN SER CALC-MCNC: 4.1 G/DL (ref 2.3–3.5)
GLUCOSE SERPL-MCNC: 127 MG/DL (ref 65–100)
HCG UR QL: NEGATIVE
HCT VFR BLD AUTO: 39.9 % (ref 35.8–46.3)
HGB BLD-MCNC: 12.7 G/DL (ref 11.7–15.4)
IMM GRANULOCYTES # BLD: 0 K/UL (ref 0–0.5)
IMM GRANULOCYTES NFR BLD AUTO: 1 % (ref 0–5)
LIPASE SERPL-CCNC: 127 U/L (ref 73–393)
LYMPHOCYTES # BLD: 1.6 K/UL (ref 0.5–4.6)
LYMPHOCYTES NFR BLD: 22 % (ref 13–44)
MCH RBC QN AUTO: 27 PG (ref 26.1–32.9)
MCHC RBC AUTO-ENTMCNC: 31.8 G/DL (ref 31.4–35)
MCV RBC AUTO: 84.7 FL (ref 79.6–97.8)
MONOCYTES # BLD: 0.3 K/UL (ref 0.1–1.3)
MONOCYTES NFR BLD: 4 % (ref 4–12)
NEUTS SEG # BLD: 5.4 K/UL (ref 1.7–8.2)
NEUTS SEG NFR BLD: 73 % (ref 43–78)
NRBC # BLD: 0 K/UL (ref 0–0.2)
PLATELET # BLD AUTO: 278 K/UL (ref 150–450)
PMV BLD AUTO: 9.9 FL (ref 9.4–12.3)
POTASSIUM SERPL-SCNC: 4.6 MMOL/L (ref 3.5–5.1)
PROT SERPL-MCNC: 7.5 G/DL
RBC # BLD AUTO: 4.71 M/UL (ref 4.05–5.2)
SODIUM SERPL-SCNC: 142 MMOL/L (ref 136–145)
WBC # BLD AUTO: 7.4 K/UL (ref 4.3–11.1)

## 2018-10-05 PROCEDURE — 74011250636 HC RX REV CODE- 250/636: Performed by: EMERGENCY MEDICINE

## 2018-10-05 PROCEDURE — 81025 URINE PREGNANCY TEST: CPT

## 2018-10-05 PROCEDURE — 80053 COMPREHEN METABOLIC PANEL: CPT

## 2018-10-05 PROCEDURE — 83690 ASSAY OF LIPASE: CPT

## 2018-10-05 PROCEDURE — 85025 COMPLETE CBC W/AUTO DIFF WBC: CPT

## 2018-10-05 PROCEDURE — 96375 TX/PRO/DX INJ NEW DRUG ADDON: CPT

## 2018-10-05 PROCEDURE — 99284 EMERGENCY DEPT VISIT MOD MDM: CPT

## 2018-10-05 PROCEDURE — 76705 ECHO EXAM OF ABDOMEN: CPT

## 2018-10-05 PROCEDURE — 96374 THER/PROPH/DIAG INJ IV PUSH: CPT

## 2018-10-05 PROCEDURE — 81003 URINALYSIS AUTO W/O SCOPE: CPT

## 2018-10-05 RX ORDER — ONDANSETRON 4 MG/1
4 TABLET, ORALLY DISINTEGRATING ORAL
Qty: 6 TAB | Refills: 0 | Status: SHIPPED | OUTPATIENT
Start: 2018-10-05

## 2018-10-05 RX ORDER — ONDANSETRON 2 MG/ML
4 INJECTION INTRAMUSCULAR; INTRAVENOUS
Status: DISCONTINUED | OUTPATIENT
Start: 2018-10-05 | End: 2018-10-05

## 2018-10-05 RX ORDER — ONDANSETRON 2 MG/ML
4 INJECTION INTRAMUSCULAR; INTRAVENOUS
Status: COMPLETED | OUTPATIENT
Start: 2018-10-05 | End: 2018-10-05

## 2018-10-05 RX ORDER — HYDROMORPHONE HYDROCHLORIDE 2 MG/ML
0.5 INJECTION, SOLUTION INTRAMUSCULAR; INTRAVENOUS; SUBCUTANEOUS
Status: COMPLETED | OUTPATIENT
Start: 2018-10-05 | End: 2018-10-05

## 2018-10-05 RX ADMIN — ONDANSETRON 4 MG: 2 INJECTION INTRAMUSCULAR; INTRAVENOUS at 09:37

## 2018-10-05 RX ADMIN — HYDROMORPHONE HYDROCHLORIDE 0.5 MG: 2 INJECTION, SOLUTION INTRAMUSCULAR; INTRAVENOUS; SUBCUTANEOUS at 09:57

## 2018-10-05 NOTE — ED PROVIDER NOTES
HPI Comments: 21-year-old morbidly obese female complaining of right upper quadrant abdominal pain. He states the pain started last night and returned this morning. She's had nothing to eat this morning. Patient states \"I have gallbladder problems\". She recently had a baby but during her pregnancy had right upper quadrant abdominal pain had which showed sludge Below is the impression of the January 2018 ultrasound.: 
 
IMPRESSION:   
1. Distended gallbladder filled with biliary sludge. 2. Mild right-sided hydronephrosis which is likely related to the patient's 
pregnancy. Patient is a 34 y.o. female presenting with abdominal pain. The history is provided by the patient. Abdominal Pain This is a recurrent problem. The current episode started 6 to 12 hours ago. The problem occurs constantly. The problem has not changed since onset. The pain is located in the RUQ. The quality of the pain is sharp. The pain is at a severity of 5/10. The pain is moderate. Associated symptoms include nausea. Nothing worsens the pain. Past medical history comments: gallbladder sludge. The patient's surgical history non-contributory. Past Medical History:  
Diagnosis Date  Epigastric abdominal pain 01/30/2018  Obesity affecting pregnancy in second trimester History reviewed. No pertinent surgical history. Family History:  
Problem Relation Age of Onset  Diabetes Maternal Grandmother  Cancer Maternal Grandmother  No Known Problems Mother  No Known Problems Father Social History Social History  Marital status:  Spouse name: N/A  
 Number of children: N/A  
 Years of education: N/A Occupational History  Not on file. Social History Main Topics  Smoking status: Former Smoker Quit date: 9/8/2017  Smokeless tobacco: Never Used  Alcohol use No  
 Drug use: No  
 Sexual activity: Yes  
  Partners: Male Birth control/ protection: None Other Topics Concern  Caffeine Concern No  
 Exercise No  
 Seat Belt Yes  Self-Exams Yes Social History Narrative ** Merged History Encounter ** Abuse: Feels safe at home, no history of physical abuse, no history of sexual abuse ALLERGIES: Review of patient's allergies indicates no known allergies. Review of Systems Constitutional: Negative. Negative for activity change. HENT: Negative. Eyes: Negative. Respiratory: Negative. Cardiovascular: Negative. Gastrointestinal: Positive for abdominal pain and nausea. Genitourinary: Negative. Musculoskeletal: Negative. Skin: Negative. Neurological: Negative. Psychiatric/Behavioral: Negative. All other systems reviewed and are negative. Vitals:  
 10/05/18 4448 BP: 109/59 Pulse: 73 Resp: 16 Temp: 98.2 °F (36.8 °C) SpO2: 99% Weight: 117.9 kg (260 lb) Height: 4' 11\" (1.499 m) Physical Exam  
Constitutional: She is oriented to person, place, and time. She appears well-developed and well-nourished. No distress. HENT:  
Head: Normocephalic and atraumatic. Right Ear: External ear normal.  
Left Ear: External ear normal.  
Nose: Nose normal.  
Mouth/Throat: Oropharynx is clear and moist. No oropharyngeal exudate. Eyes: Conjunctivae and EOM are normal. Pupils are equal, round, and reactive to light. Right eye exhibits no discharge. Left eye exhibits no discharge. No scleral icterus. Neck: Normal range of motion. Neck supple. No JVD present. No tracheal deviation present. Cardiovascular: Normal rate, regular rhythm and intact distal pulses. Pulmonary/Chest: Effort normal and breath sounds normal. No stridor. No respiratory distress. She has no wheezes. She exhibits no tenderness. Abdominal: Soft. Bowel sounds are normal. She exhibits no distension and no mass. There is tenderness in the right upper quadrant.  There is positive Casanova's sign. There is no rigidity, no rebound, no guarding and no tenderness at McBurney's point. Musculoskeletal: Normal range of motion. She exhibits no edema or tenderness. Neurological: She is alert and oriented to person, place, and time. No cranial nerve deficit. Skin: Skin is warm and dry. No rash noted. She is not diaphoretic. No erythema. No pallor. Psychiatric: She has a normal mood and affect. Her behavior is normal. Thought content normal.  
Nursing note and vitals reviewed. MDM Number of Diagnoses or Management Options Diagnosis management comments: Patient with history of gallbladder disease (sludge) is tender over the right upper quadrant we'll get an ultrasound to check her labs. Patient is afebrile vital signs are stable most likely will be able to follow-up as outpatient but ultrasound still pending. Amount and/or Complexity of Data Reviewed Clinical lab tests: ordered and reviewed Tests in the radiology section of CPT®: ordered and reviewed Tests in the medicine section of CPT®: ordered and reviewed Risk of Complications, Morbidity, and/or Mortality Presenting problems: moderate Diagnostic procedures: moderate Management options: moderate ED Course Procedures

## 2018-10-05 NOTE — ED NOTES
After speaking with Versie Frankel, Case Management, pt vomiting in bathroom. Pt has not had anything to eat or drink this morning. Dr. Amberly Marcos made aware and states to give patient some saltines and water and re-evaluate.

## 2018-10-05 NOTE — PROGRESS NOTES
Visited with patient as no PCP listed in chart. Patient states no PCP and no insurance. Explained the [address] program in detail and provided patient with resources. Contact information for Steph Cole with [address] given to patient and patient encouraged to follow up with her as soon as possible. Patient also [place] [facility] [place] encouraged to call to get screened for Medicaid/Insurance eligibility and/or financial assistance.

## 2018-10-05 NOTE — ED NOTES
I have reviewed discharge instructions with the patient and spouse. The patient and spouse verbalized understanding. Patient left ED via Discharge Method: ambulatory to Home with male visitor and daughter. Opportunity for questions and clarification provided. Patient given 1 scripts. Zofran. Instructed to follow a low fat/non-fat diet and to follow up with general surgeon listed. Pt given work excuse as requested. To continue your aftercare when you leave the hospital, you may receive an automated call from our care team to check in on how you are doing. This is a free service and part of our promise to provide the best care and service to meet your aftercare needs.  If you have questions, or wish to unsubscribe from this service please call [phone]. Thank you for Choosing our [facility] Emergency Department.

## 2018-10-05 NOTE — ED TRIAGE NOTES
Pt dx with gallbladder issues while pregnant. Was supposed to have it removed when baby was born 7 months ago but pain stopped so pt cancelled appt. Now hurting again

## 2018-10-05 NOTE — DISCHARGE INSTRUCTIONS

## 2018-10-05 NOTE — LETTER
[address] [facility] EMERGENCY DEPT 
[facility] [address] [address] 
[phone] Work/School Note Date: 10/5/2018 To Whom It May concern: 
 
Betty Peterson was seen and treated today in the emergency room by the following provider(s): 
Attending Provider: Deng Collado MD.   
 
Betty Peterson may return to work on 10/6/18.  
 
Sincerely, 
 
 
 
 
Tristan Saldana RN

## 2020-10-30 NOTE — DISCHARGE SUMMARY
Obstetrical Discharge Summary     Name: Sigrid Omalley MRN: [id number]  SSN: [id number]    YOB: 1990  Age: 34 y.o. Sex: female      Allergies: Review of patient's allergies indicates no known allergies. Admit Date: 1/30/2018    Discharge Date: 1/30/2018     Admitting Physician: Sav Friedman MD     Attending Physician:  Sav Friedman MD     * Admission Diagnoses: Abdominal pain in pregnancy, third trimester  Abdominal pain in pregnancy, third trimester    * Discharge Diagnoses:   Abdominal pain resolved. Additional Diagnoses:   Hospital Problems as of 1/30/2018  Never Reviewed          Codes Class Noted - Resolved POA    Abdominal pain in pregnancy, third trimester ICD-10-CM: O26.893, R10.9  ICD-9-CM: 646.83, 789.00  1/30/2018 - Present Unknown           No results found for: ABORH, RUBELLAEXT, GRBSEXT, ABORHEXT, RUBELLAEXT, GRBSEXT   There is no immunization history on file for this patient. * Procedures: none             * Discharge Condition: good    * Hospital Course: unremarkable     * Disposition: Home    Discharge Medications:   Current Discharge Medication List          * Follow-up Care/Patient Instructions:   Activity: Activity as tolerated  Diet: Low fat, Low cholesterol  Wound Care: None needed    Follow-up Information     Follow up With Details Comments Contact Info    Provider Unknown   Patient not available to ask             Signed By:  Tip Alicea MD     January 30, 2018
38.1

## 2023-08-16 ENCOUNTER — OFFICE VISIT (OUTPATIENT)
Dept: PRIMARY CARE CLINIC | Facility: CLINIC | Age: 33
End: 2023-08-16

## 2023-08-16 VITALS
OXYGEN SATURATION: 95 % | DIASTOLIC BLOOD PRESSURE: 83 MMHG | SYSTOLIC BLOOD PRESSURE: 131 MMHG | BODY MASS INDEX: 56.45 KG/M2 | HEART RATE: 83 BPM | TEMPERATURE: 97.8 F | HEIGHT: 59 IN | WEIGHT: 280 LBS | RESPIRATION RATE: 18 BRPM

## 2023-08-16 DIAGNOSIS — Z11.1 TUBERCULOSIS SCREENING: Primary | ICD-10-CM

## 2023-08-16 PROCEDURE — 99211 OFF/OP EST MAY X REQ PHY/QHP: CPT | Performed by: PHYSICIAN ASSISTANT

## 2023-08-16 NOTE — PROGRESS NOTES
PPD Placement note  Allyssa Blanco, 34 y.o. female is here today for placement of PPD test  Reason for PPD test: Employment  Pt taken PPD test before: yes  Verified in allergy area and with patient that they are not allergic to the products PPD is made of (Phenol or Tween). Yes  Is patient taking any oral or IV steroid medication now or have they taken it in the last month? no  Has the patient ever received the BCG vaccine?: no  Has the patient been in recent contact with anyone known or suspected of having active TB disease?: no       Date of exposure (if applicable): n/a       Name of person they were exposed to (if applicable): n/a  Patient's Country of origin?: [place]  O: Alert and oriented in NAD. P:  PPD placed on 8/16/2023. Patient advised to return for reading within 48-72 hours.

## 2023-08-18 ENCOUNTER — OFFICE VISIT (OUTPATIENT)
Dept: PRIMARY CARE CLINIC | Facility: CLINIC | Age: 33
End: 2023-08-18

## 2023-08-18 VITALS
HEART RATE: 74 BPM | TEMPERATURE: 98.6 F | WEIGHT: 280 LBS | DIASTOLIC BLOOD PRESSURE: 69 MMHG | BODY MASS INDEX: 56.45 KG/M2 | RESPIRATION RATE: 18 BRPM | OXYGEN SATURATION: 99 % | SYSTOLIC BLOOD PRESSURE: 114 MMHG | HEIGHT: 59 IN

## 2023-08-18 DIAGNOSIS — Z11.1 ENCOUNTER FOR PPD SKIN TEST READING: Primary | ICD-10-CM

## 2023-08-18 PROCEDURE — 99202 OFFICE O/P NEW SF 15 MIN: CPT | Performed by: PHYSICIAN ASSISTANT

## 2023-08-18 SDOH — ECONOMIC STABILITY: FOOD INSECURITY: WITHIN THE PAST 12 MONTHS, THE FOOD YOU BOUGHT JUST DIDN'T LAST AND YOU DIDN'T HAVE MONEY TO GET MORE.: NEVER TRUE

## 2023-08-18 SDOH — ECONOMIC STABILITY: INCOME INSECURITY: HOW HARD IS IT FOR YOU TO PAY FOR THE VERY BASICS LIKE FOOD, HOUSING, MEDICAL CARE, AND HEATING?: NOT HARD AT ALL

## 2023-08-18 SDOH — ECONOMIC STABILITY: HOUSING INSECURITY
IN THE LAST 12 MONTHS, WAS THERE A TIME WHEN YOU DID NOT HAVE A STEADY PLACE TO SLEEP OR SLEPT IN A SHELTER (INCLUDING NOW)?: NO

## 2023-08-18 SDOH — ECONOMIC STABILITY: FOOD INSECURITY: WITHIN THE PAST 12 MONTHS, YOU WORRIED THAT YOUR FOOD WOULD RUN OUT BEFORE YOU GOT MONEY TO BUY MORE.: NEVER TRUE

## 2023-08-18 ASSESSMENT — PATIENT HEALTH QUESTIONNAIRE - PHQ9
SUM OF ALL RESPONSES TO PHQ QUESTIONS 1-9: 0
SUM OF ALL RESPONSES TO PHQ9 QUESTIONS 1 & 2: 0
1. LITTLE INTEREST OR PLEASURE IN DOING THINGS: 0
SUM OF ALL RESPONSES TO PHQ QUESTIONS 1-9: 0
2. FEELING DOWN, DEPRESSED OR HOPELESS: 0

## 2023-08-18 ASSESSMENT — ENCOUNTER SYMPTOMS
EYE PAIN: 0
SORE THROAT: 0
DIARRHEA: 0
SHORTNESS OF BREATH: 0
VOMITING: 0
CONSTIPATION: 0
ABDOMINAL PAIN: 0

## 2023-09-08 ENCOUNTER — OFFICE VISIT (OUTPATIENT)
Dept: PRIMARY CARE CLINIC | Facility: CLINIC | Age: 33
End: 2023-09-08

## 2023-09-08 VITALS
WEIGHT: 277 LBS | DIASTOLIC BLOOD PRESSURE: 70 MMHG | TEMPERATURE: 97.6 F | HEIGHT: 59 IN | OXYGEN SATURATION: 100 % | BODY MASS INDEX: 55.84 KG/M2 | HEART RATE: 69 BPM | SYSTOLIC BLOOD PRESSURE: 112 MMHG | RESPIRATION RATE: 18 BRPM

## 2023-09-08 DIAGNOSIS — Z02.89 ENCOUNTER FOR PHYSICAL EXAMINATION RELATED TO EMPLOYMENT: Primary | ICD-10-CM
